# Patient Record
Sex: FEMALE | Race: BLACK OR AFRICAN AMERICAN | NOT HISPANIC OR LATINO | Employment: UNEMPLOYED | URBAN - METROPOLITAN AREA
[De-identification: names, ages, dates, MRNs, and addresses within clinical notes are randomized per-mention and may not be internally consistent; named-entity substitution may affect disease eponyms.]

---

## 2017-11-15 ENCOUNTER — OFFICE VISIT (OUTPATIENT)
Dept: OBSTETRICS AND GYNECOLOGY | Facility: CLINIC | Age: 32
End: 2017-11-15
Attending: OBSTETRICS & GYNECOLOGY
Payer: MEDICARE

## 2017-11-15 VITALS — BODY MASS INDEX: 26.25 KG/M2 | HEIGHT: 63 IN | WEIGHT: 148.13 LBS

## 2017-11-15 DIAGNOSIS — Z11.51 SCREENING FOR HUMAN PAPILLOMAVIRUS: ICD-10-CM

## 2017-11-15 DIAGNOSIS — Z01.419 ENCOUNTER FOR GYNECOLOGICAL EXAMINATION (GENERAL) (ROUTINE) WITHOUT ABNORMAL FINDINGS: ICD-10-CM

## 2017-11-15 DIAGNOSIS — Z12.4 PAP SMEAR FOR CERVICAL CANCER SCREENING: ICD-10-CM

## 2017-11-15 DIAGNOSIS — N92.0 MENORRHAGIA WITH REGULAR CYCLE: Primary | ICD-10-CM

## 2017-11-15 DIAGNOSIS — N92.2 EXCESSIVE MENSTRUATION AT PUBERTY: ICD-10-CM

## 2017-11-15 PROCEDURE — 87624 HPV HI-RISK TYP POOLED RSLT: CPT

## 2017-11-15 PROCEDURE — 99213 OFFICE O/P EST LOW 20 MIN: CPT | Mod: PBBFAC | Performed by: OBSTETRICS & GYNECOLOGY

## 2017-11-15 PROCEDURE — 88141 CYTOPATH C/V INTERPRET: CPT | Mod: ,,, | Performed by: PATHOLOGY

## 2017-11-15 PROCEDURE — G0101 CA SCREEN;PELVIC/BREAST EXAM: HCPCS | Mod: S$PBB,,, | Performed by: OBSTETRICS & GYNECOLOGY

## 2017-11-15 PROCEDURE — 99999 PR PBB SHADOW E&M-EST. PATIENT-LVL III: CPT | Mod: PBBFAC,,, | Performed by: OBSTETRICS & GYNECOLOGY

## 2017-11-15 PROCEDURE — 88175 CYTOPATH C/V AUTO FLUID REDO: CPT | Performed by: PATHOLOGY

## 2017-11-15 RX ORDER — TRAMADOL HYDROCHLORIDE 50 MG/1
TABLET ORAL
Refills: 3 | COMMUNITY
Start: 2017-10-22

## 2017-11-15 NOTE — PROGRESS NOTES
Subjective:       Patient ID: Rhina Hdez is a 32 y.o. female.    Chief Complaint:  Annual Exam (new patient - found Harper on google - last pap unknown - long time possibly over 3 years)      Patient Active Problem List   Diagnosis    Iron deficiency anemia, unspecified    Luteal cystic ovary disease    Spongiotic psoriasiform dermatitis    Severe protein-calorie malnutrition    SLE (systemic lupus erythematosus)    Hypokalemia    Chronic use of steroids    Cutaneous lupus erythematosus    Blepharitis of both eyes    Erythroderma    Impetigo    Cellulitis and abscess of other specified site    Dermatitis    Lupus    Proteinuria       History of Present Illness  32 y.o. yo  here for annual exam. New patient. Wants to get pregnant and has not been able to. On Medicare for Lupus. Had 2 spontaneous pregnancies in  and  with the same partner. Had IUD removed in  and has been trying since then. Periods are heavy and regular.       Past Medical History:   Diagnosis Date    Anemia     Asthma     Bacteremia due to methicillin resistant Staphylococcus aureus 2013    Cutaneous lupus erythematosus     Lupus 2007    Psoriasis-like skin disease 2013    SLE (systemic lupus erythematosus)     [facility]     Spongiotic psoriasiform dermatitis 2012    Punch biopsy of L leg       Past Surgical History:   Procedure Laterality Date     SECTION, CLASSIC         OB History    Para Term  AB Living   2 2 2     2   SAB TAB Ectopic Multiple Live Births           2      # Outcome Date GA Lbr Nigel/2nd Weight Sex Delivery Anes PTL Lv   2 Term 09 40w0d  3.629 kg (8 lb) F CS-LTranv   GISELLA   1 Term 08 40w0d  3.402 kg (7 lb 8 oz) F CS-LTranv   GISELLA      Obstetric Comments   Menarche at 13       Patient's last menstrual period was 10/19/2017.   Date of Last Pap: 11/15/2017    Review of Systems  Review of Systems   Constitutional: Negative  for fatigue and unexpected weight change.   Respiratory: Negative for shortness of breath.    Cardiovascular: Negative for chest pain.   Gastrointestinal: Negative for abdominal pain, constipation, diarrhea, nausea and vomiting.   Genitourinary: Negative for dysuria.   Musculoskeletal: Negative for back pain.   Skin: Negative for rash.   Neurological: Negative for headaches.   Hematological: Does not bruise/bleed easily.   Psychiatric/Behavioral: Negative for behavioral problems.        Objective:   Physical Exam:   Constitutional: She is oriented to person, place, and time. Vital signs are normal. She appears well-developed and well-nourished. No distress.        Pulmonary/Chest: She exhibits no mass. Right breast exhibits no mass, no nipple discharge, no skin change, no tenderness, no bleeding and no swelling. Left breast exhibits no mass, no nipple discharge, no skin change, no tenderness, no bleeding and no swelling. Breasts are symmetrical.        Abdominal: Soft. Normal appearance and bowel sounds are normal. She exhibits no distension and no mass. There is no tenderness. There is no rebound.     Genitourinary: Vagina normal. There is no rash, tenderness, lesion or injury on the right labia. There is no rash, tenderness, lesion or injury on the left labia. Uterus is enlarged (possibly enlarged, difficult exam). Uterus is not deviated, not fixed, not tender, not hosting fibroids and not experiencing uterine prolapse. Cervix is normal. Right adnexum displays no mass, no tenderness and no fullness. Left adnexum displays no mass, no tenderness and no fullness. No erythema (some cracks in skin possible c/w yeast. pt says it is from Lupus and that it is always like that and she does not want treatment for it), tenderness, rectocele, cystocele or unspecified prolapse of vaginal walls in the vagina. No vaginal discharge found. Cervix exhibits no motion tenderness, no discharge and no friability.        Uterus Size: 10  cm   Musculoskeletal: Normal range of motion and moves all extremeties.      Lymphadenopathy:     She has no axillary adenopathy.        Right: No supraclavicular adenopathy present.        Left: No supraclavicular adenopathy present.    Neurological: She is alert and oriented to person, place, and time.    Skin: Skin is warm and dry.    Psychiatric: She has a normal mood and affect. Her behavior is normal. Judgment normal.        Assessment/ Plan:     1. Menorrhagia with regular cycle  US Pelvis Comp with Transvag NON-OB (xpd   2. Excessive menstruation at puberty     3. Screening for human papillomavirus  HPV High Risk Genotypes, PCR   4. Pap smear for cervical cancer screening  Liquid-based pap smear, screening   5. Encounter for gynecological examination (general) (routine) without abnormal findings       rec RTC for GYN u/s for menorrhagia.   Also trying to get pregnant. I suggested SA and pt is worried about cost.     Follow-up with me in 1 year

## 2017-11-20 LAB
HPV16 AG SPEC QL: NEGATIVE
HPV16+18+H RISK 12 DNA CVX-IMP: NEGATIVE
HPV18 DNA SPEC QL NAA+PROBE: NEGATIVE

## 2017-11-27 ENCOUNTER — TELEPHONE (OUTPATIENT)
Dept: OBSTETRICS AND GYNECOLOGY | Facility: CLINIC | Age: 32
End: 2017-11-27

## 2017-11-27 NOTE — TELEPHONE ENCOUNTER
----- Message from Geetha Saleem MD sent at 11/27/2017 12:50 PM CST -----  Call pt or send letter and tell them.....    Good News!! Everything came back fine with your pap smear. The purpose of a pap smear is to detect abnormal cells on the cervix. The initial reading on your pap smear showed a few mildly abnormal cells. For this reason, I had the lab run a HPV test on your pap smear. HPV is Human Papilloma Virus- which is a sexually transmitted virus that can give your abnormal cells on your pap smear. And your pap was NEGATIVE for HPV! Having a negative HPV is the best confirmation that you do not have cervical cancer and the chances of you getting cervical cancer in the next 3-5 years is extremely small. So everything looks good!! I recommend you repeat your pap smear in 1 year. Please call or message if you have any other questions.  Sincerely,  Dr. Saleem

## 2017-11-27 NOTE — TELEPHONE ENCOUNTER
Called pt with normal pap/hpv results and pt requested to change her upcoming appts. Changed in EPIC.

## 2020-10-20 ENCOUNTER — HOSPITAL ENCOUNTER (OUTPATIENT)
Dept: RADIOLOGY | Facility: OTHER | Age: 35
Discharge: HOME | End: 2020-10-20
Attending: OTOLARYNGOLOGY
Payer: MEDICARE

## 2020-10-20 DIAGNOSIS — H90.0 CONDUCTIVE HEARING LOSS OF BOTH EARS: ICD-10-CM

## 2020-10-20 PROCEDURE — 70480 CT ORBIT/EAR/FOSSA W/O DYE: CPT | Mod: 26,,, | Performed by: RADIOLOGY

## 2020-10-20 PROCEDURE — 70480 CT TEMPORAL BONE WITHOUT CONTRAST: ICD-10-PCS | Mod: 26,,, | Performed by: RADIOLOGY

## 2020-10-20 PROCEDURE — 70480 CT ORBIT/EAR/FOSSA W/O DYE: CPT | Mod: TC

## 2024-10-14 ENCOUNTER — PATIENT MESSAGE (OUTPATIENT)
Dept: GASTROENTEROLOGY | Facility: CLINIC | Age: 39
End: 2024-10-14
Payer: MEDICARE

## 2025-05-14 ENCOUNTER — HOSPITAL ENCOUNTER (INPATIENT)
Facility: HOSPITAL | Age: 40
LOS: 3 days | Discharge: HOME | DRG: 153 | End: 2025-05-17
Attending: STUDENT IN AN ORGANIZED HEALTH CARE EDUCATION/TRAINING PROGRAM | Admitting: STUDENT IN AN ORGANIZED HEALTH CARE EDUCATION/TRAINING PROGRAM
Payer: MEDICARE

## 2025-05-14 DIAGNOSIS — R07.9 CHEST PAIN: ICD-10-CM

## 2025-05-14 DIAGNOSIS — H66.90 RECURRENT AOM (ACUTE OTITIS MEDIA): Primary | ICD-10-CM

## 2025-05-14 DIAGNOSIS — H70.90 MASTOIDITIS: ICD-10-CM

## 2025-05-14 DIAGNOSIS — L30.8 SPONGIOTIC PSORIASIFORM DERMATITIS: ICD-10-CM

## 2025-05-14 DIAGNOSIS — R29.818 ACUTE FOCAL NEUROLOGICAL DEFICIT: ICD-10-CM

## 2025-05-14 DIAGNOSIS — H70.91 MASTOIDITIS OF RIGHT SIDE: ICD-10-CM

## 2025-05-14 LAB
ABSOLUTE EOSINOPHIL (OHS): 0.23 K/UL
ABSOLUTE MONOCYTE (OHS): 0.68 K/UL (ref 0.3–1)
ABSOLUTE NEUTROPHIL COUNT (OHS): 4.77 K/UL (ref 1.8–7.7)
ALBUMIN SERPL BCP-MCNC: 3.3 G/DL (ref 3.5–5.2)
ALP SERPL-CCNC: 85 UNIT/L (ref 40–150)
ALT SERPL W/O P-5'-P-CCNC: 9 UNIT/L (ref 10–44)
ANION GAP (OHS): 14 MMOL/L (ref 8–16)
AST SERPL-CCNC: 17 UNIT/L (ref 11–45)
B-HCG UR QL: NEGATIVE
BASOPHILS # BLD AUTO: 0.06 K/UL
BASOPHILS NFR BLD AUTO: 0.6 %
BILIRUB SERPL-MCNC: 0.4 MG/DL (ref 0.1–1)
BUN SERPL-MCNC: 14 MG/DL (ref 6–20)
CALCIUM SERPL-MCNC: 9.6 MG/DL (ref 8.7–10.5)
CHLORIDE SERPL-SCNC: 102 MMOL/L (ref 95–110)
CO2 SERPL-SCNC: 19 MMOL/L (ref 23–29)
CREAT SERPL-MCNC: 0.8 MG/DL (ref 0.5–1.4)
CRP SERPL-MCNC: 32.7 MG/L
CTP QC/QA: YES
ERYTHROCYTE [DISTWIDTH] IN BLOOD BY AUTOMATED COUNT: 15.3 % (ref 11.5–14.5)
GFR SERPLBLD CREATININE-BSD FMLA CKD-EPI: >60 ML/MIN/1.73/M2
GLUCOSE SERPL-MCNC: 90 MG/DL (ref 70–110)
HCT VFR BLD AUTO: 33 % (ref 37–48.5)
HGB BLD-MCNC: 11.2 GM/DL (ref 12–16)
IMM GRANULOCYTES # BLD AUTO: 0.04 K/UL (ref 0–0.04)
IMM GRANULOCYTES NFR BLD AUTO: 0.4 % (ref 0–0.5)
LYMPHOCYTES # BLD AUTO: 3.62 K/UL (ref 1–4.8)
MCH RBC QN AUTO: 26.7 PG (ref 27–31)
MCHC RBC AUTO-ENTMCNC: 33.9 G/DL (ref 32–36)
MCV RBC AUTO: 79 FL (ref 82–98)
NUCLEATED RBC (/100WBC) (OHS): 0 /100 WBC
PLATELET # BLD AUTO: 437 K/UL (ref 150–450)
PMV BLD AUTO: 9.3 FL (ref 9.2–12.9)
POCT GLUCOSE: 104 MG/DL (ref 70–110)
POTASSIUM SERPL-SCNC: 3.9 MMOL/L (ref 3.5–5.1)
PROCALCITONIN SERPL-MCNC: 0.03 NG/ML
PROT SERPL-MCNC: 9.6 GM/DL (ref 6–8.4)
RBC # BLD AUTO: 4.19 M/UL (ref 4–5.4)
RELATIVE EOSINOPHIL (OHS): 2.4 %
RELATIVE LYMPHOCYTE (OHS): 38.5 % (ref 18–48)
RELATIVE MONOCYTE (OHS): 7.2 % (ref 4–15)
RELATIVE NEUTROPHIL (OHS): 50.9 % (ref 38–73)
SODIUM SERPL-SCNC: 135 MMOL/L (ref 136–145)
TSH SERPL-ACNC: 0.66 UIU/ML (ref 0.4–4)
WBC # BLD AUTO: 9.4 K/UL (ref 3.9–12.7)

## 2025-05-14 PROCEDURE — 80053 COMPREHEN METABOLIC PANEL: CPT

## 2025-05-14 PROCEDURE — 96375 TX/PRO/DX INJ NEW DRUG ADDON: CPT

## 2025-05-14 PROCEDURE — 96361 HYDRATE IV INFUSION ADD-ON: CPT

## 2025-05-14 PROCEDURE — 85025 COMPLETE CBC W/AUTO DIFF WBC: CPT

## 2025-05-14 PROCEDURE — 25500020 PHARM REV CODE 255: Performed by: STUDENT IN AN ORGANIZED HEALTH CARE EDUCATION/TRAINING PROGRAM

## 2025-05-14 PROCEDURE — 63600175 PHARM REV CODE 636 W HCPCS: Performed by: STUDENT IN AN ORGANIZED HEALTH CARE EDUCATION/TRAINING PROGRAM

## 2025-05-14 PROCEDURE — 99285 EMERGENCY DEPT VISIT HI MDM: CPT | Mod: 25

## 2025-05-14 PROCEDURE — 25000003 PHARM REV CODE 250: Performed by: STUDENT IN AN ORGANIZED HEALTH CARE EDUCATION/TRAINING PROGRAM

## 2025-05-14 PROCEDURE — 84443 ASSAY THYROID STIM HORMONE: CPT

## 2025-05-14 PROCEDURE — 81025 URINE PREGNANCY TEST: CPT

## 2025-05-14 PROCEDURE — 84145 PROCALCITONIN (PCT): CPT | Performed by: STUDENT IN AN ORGANIZED HEALTH CARE EDUCATION/TRAINING PROGRAM

## 2025-05-14 PROCEDURE — 82962 GLUCOSE BLOOD TEST: CPT

## 2025-05-14 PROCEDURE — 87040 BLOOD CULTURE FOR BACTERIA: CPT | Performed by: STUDENT IN AN ORGANIZED HEALTH CARE EDUCATION/TRAINING PROGRAM

## 2025-05-14 PROCEDURE — 86140 C-REACTIVE PROTEIN: CPT | Performed by: STUDENT IN AN ORGANIZED HEALTH CARE EDUCATION/TRAINING PROGRAM

## 2025-05-14 PROCEDURE — 96365 THER/PROPH/DIAG IV INF INIT: CPT

## 2025-05-14 PROCEDURE — 12000002 HC ACUTE/MED SURGE SEMI-PRIVATE ROOM

## 2025-05-14 RX ORDER — SERTRALINE HYDROCHLORIDE 100 MG/1
100 TABLET, FILM COATED ORAL DAILY
COMMUNITY
Start: 2024-11-21

## 2025-05-14 RX ORDER — KETOROLAC TROMETHAMINE 30 MG/ML
15 INJECTION, SOLUTION INTRAMUSCULAR; INTRAVENOUS
Status: COMPLETED | OUTPATIENT
Start: 2025-05-14 | End: 2025-05-14

## 2025-05-14 RX ORDER — VALACYCLOVIR HYDROCHLORIDE 500 MG/1
1000 TABLET, FILM COATED ORAL ONCE
Status: COMPLETED | OUTPATIENT
Start: 2025-05-14 | End: 2025-05-14

## 2025-05-14 RX ORDER — AMOXICILLIN 500 MG/1
500 TABLET, FILM COATED ORAL 2 TIMES DAILY
Status: ON HOLD | COMMUNITY
Start: 2025-05-07 | End: 2025-05-17 | Stop reason: HOSPADM

## 2025-05-14 RX ORDER — AMOXICILLIN AND CLAVULANATE POTASSIUM 875; 125 MG/1; MG/1
1 TABLET, FILM COATED ORAL
Status: DISCONTINUED | OUTPATIENT
Start: 2025-05-14 | End: 2025-05-14

## 2025-05-14 RX ADMIN — PIPERACILLIN AND TAZOBACTAM 4.5 G: 4; .5 INJECTION, POWDER, LYOPHILIZED, FOR SOLUTION INTRAVENOUS; PARENTERAL at 09:05

## 2025-05-14 RX ADMIN — KETOROLAC TROMETHAMINE 15 MG: 30 INJECTION, SOLUTION INTRAMUSCULAR; INTRAVENOUS at 06:05

## 2025-05-14 RX ADMIN — IOHEXOL 75 ML: 350 INJECTION, SOLUTION INTRAVENOUS at 06:05

## 2025-05-14 RX ADMIN — SODIUM CHLORIDE, POTASSIUM CHLORIDE, SODIUM LACTATE AND CALCIUM CHLORIDE 1000 ML: 600; 310; 30; 20 INJECTION, SOLUTION INTRAVENOUS at 06:05

## 2025-05-14 RX ADMIN — VALACYCLOVIR HYDROCHLORIDE 1000 MG: 500 TABLET, FILM COATED ORAL at 09:05

## 2025-05-14 NOTE — ED TRIAGE NOTES
Pt c/o right sided ear pain, headache, and right sided facial droop to her face since x1 week. Pt was seen and tx at  last wednesday for earache and prescribed amoxicillin which pt reports completing. Pt also states that she had a few febrile episodes but was able to manage at home. Pt denies any CP, numbness/tingling, weakness, SOB, n/v/d, or urinary symptoms. Pmhx of bacteremia and lupus. Pt is [place] in right ear, use of hearing device.

## 2025-05-14 NOTE — ED PROVIDER NOTES
"Encounter Date: 5/14/2025    SCRIBE #1 NOTE: I, Alexa Luna, am scribing for, and in the presence of,  Adrienne Farr MD. I have scribed the following portions of the note - Other sections scribed: HPI, ROS, PE.       History     Chief Complaint   Patient presents with    Facial Droop     Pt reports having right ear pain on Wednesday, went to  and had her ears "pumped out". Pt reports Wednesday night she began having generalized headaches and drooping/paralysis/pain to right side of her face. Pt denies change in vision.      39-year-old female, with a PMHx of SLE, hearing loss 2/2 SLE, DEIDRE, and asthma, presents to the ED for evaluation of R ear pain that began 1 week ago. Patient reports associated generalized headache, R hearing loss, R ear drainage, R sided headache, blurry vision, and night sweats. She does note that she has had narrowing R ear canal and R hearing loss 2/2 lupus since 2011. She reports she was seen at  7 days ago for the same complaints, her ears were both flushed out, and she was prescribed a 7-day-course of Amoxil 500 mg for her R ear infection and dental infection. She endorses she began taking her Amoxil 6 days ago, took them as prescribed, and has completed them. She reports she began having L hearing loss after her ears were flushed out. She states she has not seen ENT this year and missed an ENT appointment on 5/6/2025.     Patient reports she woke up 6 days ago with R facial droop, no history of Bell's palsy.    She reports she has been having fevers, her T-max 2 days ago was 102 F, and it decreased to 101 F yesterday. She reports attempting treatment for symptoms with Tylenol and ice packs.     She reports dental pain.     Denies history of DM. Denies chest pain, SOB, sore throat, cough, urinary complaints, change in bowel movements, difficulty having bowel movements, sublingual pain, oral pain, or other associated symptoms.     The history is provided by the patient. No language "  was used.     Review of patient's allergies indicates:   Allergen Reactions    Sulfa (sulfonamide antibiotics) Rash     Past Medical History:   Diagnosis Date    Anemia     Asthma     Bacteremia due to methicillin resistant Staphylococcus aureus 2013    Cutaneous lupus erythematosus     Lupus 2007    Psoriasis-like skin disease 2013    SLE (systemic lupus erythematosus)     [facility]     Spongiotic psoriasiform dermatitis     Punch biopsy of L leg     Past Surgical History:   Procedure Laterality Date     SECTION, CLASSIC       Family History   Problem Relation Name Age of Onset    Breast cancer Mother  52        passed at age 52     Social History[1]  Review of Systems   Constitutional:  Positive for diaphoresis and fever.   HENT:  Positive for dental problem, ear discharge (R) and ear pain (R). Negative for sore throat.    Eyes:  Positive for visual disturbance (blurry vision).   Respiratory:  Negative for cough and shortness of breath.    Cardiovascular:  Negative for chest pain and leg swelling.   Gastrointestinal:  Negative for abdominal pain, diarrhea, nausea and vomiting.   Genitourinary:  Negative for dysuria and hematuria.   Musculoskeletal:  Negative for back pain and neck pain.   Skin:  Negative for rash.   Neurological:  Positive for headaches. Negative for syncope.       Physical Exam     Initial Vitals [25 1559]   BP Pulse Resp Temp SpO2   (!) 192/115 (!) 111 18 97.4 °F (36.3 °C) 100 %      MAP       --         Physical Exam    Nursing note and vitals reviewed.  Constitutional:  Non-toxic appearance. No distress.   HENT:   Head: Atraumatic.   Right Ear: There is mastoid tenderness.   Left Ear: No tenderness. No mastoid tenderness. Mouth/Throat: Mucous membranes are normal. No posterior oropharyngeal edema or posterior oropharyngeal erythema.   R ear canal is significantly narrowed, with significant purulent fluid draining, No tenderness  to external R ear. L ear canal is narrowed, with clear fluid. No sublingual tenderness.   Eyes: Conjunctivae and EOM are normal.   Neck:   No meningismus.   Cardiovascular:  Normal rate, regular rhythm, normal heart sounds and intact distal pulses.           No murmur heard.  Pulmonary/Chest: Breath sounds normal. No respiratory distress. She has no wheezes. She has no rhonchi.   Abdominal: Abdomen is soft. She exhibits no distension. There is no abdominal tenderness.   No right CVA tenderness.  No left CVA tenderness. There is no guarding.     Neurological: She is alert and oriented to person, place, and time. A cranial nerve deficit is present. No sensory deficit.   Right facial droop, unable to lift right forehead, unable to close right eyelid         ED Course   Procedures  Labs Reviewed   COMPREHENSIVE METABOLIC PANEL - Abnormal       Result Value    Sodium 135 (*)     Potassium 3.9      Chloride 102      CO2 19 (*)     Glucose 90      BUN 14      Creatinine 0.8      Calcium 9.6      Protein Total 9.6 (*)     Albumin 3.3 (*)     Bilirubin Total 0.4      ALP 85      AST 17      ALT 9 (*)     Anion Gap 14      eGFR >60      Narrative:     Specimen slightly hemolyzed.    CBC WITH DIFFERENTIAL - Abnormal    WBC 9.40      RBC 4.19      HGB 11.2 (*)     HCT 33.0 (*)     MCV 79 (*)     MCH 26.7 (*)     MCHC 33.9      RDW 15.3 (*)     Platelet Count 437      MPV 9.3      Nucleated RBC 0      Neut % 50.9      Lymph % 38.5      Mono % 7.2      Eos % 2.4      Basophil % 0.6      Imm Grans % 0.4      Neut # 4.77      Lymph # 3.62      Mono # 0.68      Eos # 0.23      Baso # 0.06      Imm Grans # 0.04     C-REACTIVE PROTEIN - Abnormal    CRP 32.7 (*)    CULTURE, BLOOD - Normal    Blood Culture No Growth After 6 Hours     TSH - Normal    TSH 0.664     PROCALCITONIN - Normal    Procalcitonin 0.03     CULTURE, BLOOD   CBC W/ AUTO DIFFERENTIAL    Narrative:     The following orders were created for panel order CBC W/ AUTO  DIFFERENTIAL.  Procedure                               Abnormality         Status                     ---------                               -----------         ------                     CBC with Differential[[id number]]       Abnormal            Final result                 Please view results for these tests on the individual orders.   BASIC METABOLIC PANEL   MAGNESIUM   PHOSPHORUS   CBC W/ AUTO DIFFERENTIAL    Narrative:     The following orders were created for panel order CBC with Automated Differential.  Procedure                               Abnormality         Status                     ---------                               -----------         ------                     CBC with Differential[[id number]]                                                        Please view results for these tests on the individual orders.   HEMOGLOBIN A1C   CBC WITH DIFFERENTIAL   POCT URINE PREGNANCY    POC Preg Test, Ur Negative       Acceptable Yes     POCT GLUCOSE, HAND-HELD DEVICE   POCT GLUCOSE    POCT Glucose 104            Imaging Results              CT Temporal Bone with Contrast (Final result)  Result time 05/14/25 19:41:47      Final result by Ronen Feliciano MD (05/14/25 19:41:47)                   Impression:      1. No acute intracranial abnormalities identified.  2. Complete opacification of the bilateral mastoid air cells and middle ear cavities.  Such findings can be seen with potential otitis media and mastoiditis in the right clinical setting.  3. Complete occlusion of the right and partial incomplete occlusion of the left external auditory canals.  Such findings can be seen with potential otitis externa.  Clinical correlation is obviously essential.      Electronically signed by: Ronen Feliciano MD  Date:    05/14/2025  Time:    19:41               Narrative:    EXAMINATION:  CT TEMPORAL BONE WITH CONTRAST; CT HEAD WITHOUT CONTRAST    CLINICAL HISTORY:  Mastoiditis;; Headache, new or  worsening, neuro deficit (Age 19-49y);    TECHNIQUE:  CT head with sagittal and coronal reformats were obtained without the use of IV contrast.  CT temporal bone was also obtained by separate acquisition following administration of 75 cc Omnipaque 350 IV contrast (sagittal and coronal reformats were not provided).    COMPARISON:  None    FINDINGS:  Complete fluid opacification is seen of the bilateral mastoid air cells and middle ear cavities.  There is complete occlusion of the right external auditory canal and partial incomplete occlusion of the left external auditory canal.    No evidence of acute/recent major vascular distribution cerebral infarction, intraparenchymal hemorrhage, or intra-axial space occupying lesion. The ventricular system is normal in size and configuration with no evidence of hydrocephalus. No effacement of the skull-base cisterns. No abnormal extra-axial fluid collections or blood products.  Left maxillary mucous retention cyst or polyp is noted.  Remaining visualized paranasal sinuses are essentially clear.  The calvarium shows no significant abnormality.                                       CT Head Without Contrast (Final result)  Result time 05/14/25 19:41:47      Final result by Ronen Feliciano MD (05/14/25 19:41:47)                   Impression:      1. No acute intracranial abnormalities identified.  2. Complete opacification of the bilateral mastoid air cells and middle ear cavities.  Such findings can be seen with potential otitis media and mastoiditis in the right clinical setting.  3. Complete occlusion of the right and partial incomplete occlusion of the left external auditory canals.  Such findings can be seen with potential otitis externa.  Clinical correlation is obviously essential.      Electronically signed by: Ronen Feliciano MD  Date:    05/14/2025  Time:    19:41               Narrative:    EXAMINATION:  CT TEMPORAL BONE WITH CONTRAST; CT HEAD WITHOUT  CONTRAST    CLINICAL HISTORY:  Mastoiditis;; Headache, new or worsening, neuro deficit (Age 19-49y);    TECHNIQUE:  CT head with sagittal and coronal reformats were obtained without the use of IV contrast.  CT temporal bone was also obtained by separate acquisition following administration of 75 cc Omnipaque 350 IV contrast (sagittal and coronal reformats were not provided).    COMPARISON:  None    FINDINGS:  Complete fluid opacification is seen of the bilateral mastoid air cells and middle ear cavities.  There is complete occlusion of the right external auditory canal and partial incomplete occlusion of the left external auditory canal.    No evidence of acute/recent major vascular distribution cerebral infarction, intraparenchymal hemorrhage, or intra-axial space occupying lesion. The ventricular system is normal in size and configuration with no evidence of hydrocephalus. No effacement of the skull-base cisterns. No abnormal extra-axial fluid collections or blood products.  Left maxillary mucous retention cyst or polyp is noted.  Remaining visualized paranasal sinuses are essentially clear.  The calvarium shows no significant abnormality.                                       Medications   artificial tears 0.5 % ophthalmic solution 1 drop (has no administration in time range)   hydroxychloroquine tablet 200 mg (has no administration in time range)   sertraline tablet 100 mg (has no administration in time range)   sodium chloride 0.9% flush 3 mL (has no administration in time range)   naloxone 0.4 mg/mL injection 0.02 mg (has no administration in time range)   oxyCODONE immediate release tablet 5 mg (has no administration in time range)   morphine injection 4 mg (4 mg Intravenous Given 5/15/25 0140)   acetaminophen tablet 650 mg (has no administration in time range)   polyethylene glycol packet 17 g (has no administration in time range)   ondansetron injection 4 mg (has no administration in time range)    piperacillin-tazobactam (ZOSYN) 4.5 g in D5W 100 mL IVPB (MB+) (has no administration in time range)   heparin (porcine) injection 5,000 Units (has no administration in time range)   lactated ringers bolus 1,000 mL (0 mLs Intravenous Stopped 5/14/25 2020)   ketorolac injection 15 mg (15 mg Intravenous Given 5/14/25 1827)   iohexoL (OMNIPAQUE 350) injection 75 mL (75 mLs Intravenous Given 5/14/25 1840)   piperacillin-tazobactam (ZOSYN) 4.5 g in D5W 100 mL IVPB (MB+) (0 g Intravenous Stopped 5/14/25 2136)   valACYclovir tablet 1,000 mg (1,000 mg Oral Given 5/14/25 2120)     Medical Decision Making  39-year-old female, with a PMHx of SLE, hearing loss 2/2 SLE, DEIDRE, and asthma, presents to the ED for evaluation of R ear pain that began 1 week ago. Patient reports associated generalized headache, R hearing loss, R ear drainage, R sided headache, blurry vision, and night sweats.     Differential diagnosis includes but is not limited to: AOM, mastoiditis, external otitis, Bell's Palsy    Patient with patrizia purulent drainage from right ear, irrigated, with significant narrowing of bilateral ear canals (2/2 cutaneous lupus?), unable to visualize tympanic membranes, clear fluid draining from left ear.  Does have mastoid tenderness on the right, with inconclusive CT imaging, has significant ENT history from 2020 chart review with mastoiditis findings on CT (also purportedly with Bell's palsy at that time) but had not followed with ENT.  Given mastoid tenderness on the right, with patient reporting fevers this past week, after amoxicillin, discussed admission with IV antibiotics or mastoiditis with the ENT evaluation tomorrow, versus trial of outpatient Augmentin for broader coverage.  Patient reports that she has been attempting to get to her ENT Clinic, but has been unable to schedule an appointment until mid June.  We will start antibiotics, admit patient, for ENT evaluation tomorrow for possible mastoiditis.  Discussed  with radiology regarding obtaining MRI for acute versus chronic mastoiditis, per Radiology MRI imaging would not increase sensitivity for acute versus chronic mastoiditis.    Patient is out of the window for best valacyclovir administration for Bell's palsy however will start valacyclovir here, though given acute infection facial palsy may be due to her otomastoiditis, no evidence of vesicular lesions on exam.    Patient with no evidence of sepsis, no tachycardia, no leukocytosis, procalcitonin is normal.  CRP is elevated at 32.    After review of the patient's physical exam, ED testing, and history/symptoms, the patient requires additional care in the hospital overnight. The hospital medicine service will accept the patient and relevant labs, imaging, or procedures were discussed and they were made aware of any pending labs/imaging/interventions. The diagnosis, treatment and plan were discussed with the patient. All questions and/or concerns have been addressed to the best of my ability.      Please put in 30 minutes of critical care due to patient having severe infection requiring IV antibiotics.  Separate from teaching and exclusive of procedure and ekg time  Includes:  Time at bedside  Time reviewing test results  Time discussing case with staff  Time documenting the medical record  Time spent with family members  Time spent with consults  Management          Amount and/or Complexity of Data Reviewed  Labs: ordered.  Radiology: ordered.    Risk  Prescription drug management.  Decision regarding hospitalization.            Scribe Attestation:   Scribe #1: I performed the above scribed service and the documentation accurately describes the services I performed. I attest to the accuracy of the note.                           I, Adrienne Farr, personally performed the services described in this documentation. All medical record entries made by the scribe were at my direction and in my presence. I have reviewed the  chart and agree that the record reflects my personal performance and is accurate and complete.      Clinical Impression:  Final diagnoses:  [R29.818] Acute focal neurological deficit  [H66.90] Recurrent AOM (acute otitis media) (Primary)  [H70.90] Mastoiditis          ED Disposition Condition    Admit                       [1]   Social History  Tobacco Use    Smoking status: Every Day     Current packs/day: 0.50     Average packs/day: 0.5 packs/day for 2.0 years (1.0 ttl pk-yrs)     Types: Cigarettes    Smokeless tobacco: Never   Substance Use Topics    Alcohol use: Yes     Comment: on occassion    Drug use: No        Adrienne Farr MD  05/15/25 0449

## 2025-05-15 PROBLEM — D64.9 ANEMIA: Status: RESOLVED | Noted: 2025-05-15 | Resolved: 2025-05-15

## 2025-05-15 PROBLEM — D64.9 ANEMIA: Status: ACTIVE | Noted: 2025-05-15

## 2025-05-15 PROBLEM — H70.90 MASTOIDITIS: Status: ACTIVE | Noted: 2025-05-15

## 2025-05-15 LAB
ABSOLUTE EOSINOPHIL (OHS): 0.24 K/UL
ABSOLUTE MONOCYTE (OHS): 0.53 K/UL (ref 0.3–1)
ABSOLUTE NEUTROPHIL COUNT (OHS): 2.44 K/UL (ref 1.8–7.7)
ANION GAP (OHS): 11 MMOL/L (ref 8–16)
BASOPHILS # BLD AUTO: 0.05 K/UL
BASOPHILS NFR BLD AUTO: 0.8 %
BUN SERPL-MCNC: 12 MG/DL (ref 6–20)
CALCIUM SERPL-MCNC: 8.7 MG/DL (ref 8.7–10.5)
CHLORIDE SERPL-SCNC: 105 MMOL/L (ref 95–110)
CO2 SERPL-SCNC: 24 MMOL/L (ref 23–29)
CREAT SERPL-MCNC: 0.9 MG/DL (ref 0.5–1.4)
EAG (OHS): 82 MG/DL (ref 68–131)
ERYTHROCYTE [DISTWIDTH] IN BLOOD BY AUTOMATED COUNT: 15.2 % (ref 11.5–14.5)
GFR SERPLBLD CREATININE-BSD FMLA CKD-EPI: >60 ML/MIN/1.73/M2
GLUCOSE SERPL-MCNC: 86 MG/DL (ref 70–110)
HBA1C MFR BLD: 4.5 % (ref 4–5.6)
HCT VFR BLD AUTO: 29.5 % (ref 37–48.5)
HGB BLD-MCNC: 9.8 GM/DL (ref 12–16)
IMM GRANULOCYTES # BLD AUTO: 0.01 K/UL (ref 0–0.04)
IMM GRANULOCYTES NFR BLD AUTO: 0.2 % (ref 0–0.5)
LYMPHOCYTES # BLD AUTO: 2.95 K/UL (ref 1–4.8)
MAGNESIUM SERPL-MCNC: 1.5 MG/DL (ref 1.6–2.6)
MCH RBC QN AUTO: 26.5 PG (ref 27–31)
MCHC RBC AUTO-ENTMCNC: 33.2 G/DL (ref 32–36)
MCV RBC AUTO: 80 FL (ref 82–98)
NUCLEATED RBC (/100WBC) (OHS): 0 /100 WBC
PHOSPHATE SERPL-MCNC: 4.1 MG/DL (ref 2.7–4.5)
PLATELET # BLD AUTO: 392 K/UL (ref 150–450)
PMV BLD AUTO: 9.5 FL (ref 9.2–12.9)
POTASSIUM SERPL-SCNC: 3.1 MMOL/L (ref 3.5–5.1)
RBC # BLD AUTO: 3.7 M/UL (ref 4–5.4)
RELATIVE EOSINOPHIL (OHS): 3.9 %
RELATIVE LYMPHOCYTE (OHS): 47.4 % (ref 18–48)
RELATIVE MONOCYTE (OHS): 8.5 % (ref 4–15)
RELATIVE NEUTROPHIL (OHS): 39.2 % (ref 38–73)
SODIUM SERPL-SCNC: 140 MMOL/L (ref 136–145)
WBC # BLD AUTO: 6.22 K/UL (ref 3.9–12.7)

## 2025-05-15 PROCEDURE — 63600175 PHARM REV CODE 636 W HCPCS

## 2025-05-15 PROCEDURE — 25000003 PHARM REV CODE 250

## 2025-05-15 PROCEDURE — 11000001 HC ACUTE MED/SURG PRIVATE ROOM

## 2025-05-15 PROCEDURE — 80048 BASIC METABOLIC PNL TOTAL CA: CPT

## 2025-05-15 PROCEDURE — 63600175 PHARM REV CODE 636 W HCPCS: Performed by: OTOLARYNGOLOGY

## 2025-05-15 PROCEDURE — 83735 ASSAY OF MAGNESIUM: CPT

## 2025-05-15 PROCEDURE — 25000003 PHARM REV CODE 250: Performed by: HOSPITALIST

## 2025-05-15 PROCEDURE — 84100 ASSAY OF PHOSPHORUS: CPT

## 2025-05-15 PROCEDURE — 87070 CULTURE OTHR SPECIMN AEROBIC: CPT | Performed by: OTOLARYNGOLOGY

## 2025-05-15 PROCEDURE — 83036 HEMOGLOBIN GLYCOSYLATED A1C: CPT

## 2025-05-15 PROCEDURE — 25000003 PHARM REV CODE 250: Performed by: OTOLARYNGOLOGY

## 2025-05-15 PROCEDURE — 87075 CULTR BACTERIA EXCEPT BLOOD: CPT | Performed by: OTOLARYNGOLOGY

## 2025-05-15 PROCEDURE — 99223 1ST HOSP IP/OBS HIGH 75: CPT | Mod: ,,, | Performed by: OTOLARYNGOLOGY

## 2025-05-15 PROCEDURE — 63600175 PHARM REV CODE 636 W HCPCS: Performed by: HOSPITALIST

## 2025-05-15 PROCEDURE — 85025 COMPLETE CBC W/AUTO DIFF WBC: CPT

## 2025-05-15 RX ORDER — MUPIROCIN 20 MG/G
OINTMENT TOPICAL 2 TIMES DAILY
Status: DISCONTINUED | OUTPATIENT
Start: 2025-05-15 | End: 2025-05-17 | Stop reason: HOSPADM

## 2025-05-15 RX ORDER — HEPARIN SODIUM 5000 [USP'U]/ML
5000 INJECTION, SOLUTION INTRAVENOUS; SUBCUTANEOUS EVERY 8 HOURS
Status: DISCONTINUED | OUTPATIENT
Start: 2025-05-15 | End: 2025-05-17 | Stop reason: HOSPADM

## 2025-05-15 RX ORDER — POTASSIUM CHLORIDE 7.45 MG/ML
10 INJECTION INTRAVENOUS
Status: COMPLETED | OUTPATIENT
Start: 2025-05-15 | End: 2025-05-15

## 2025-05-15 RX ORDER — ONDANSETRON HYDROCHLORIDE 2 MG/ML
4 INJECTION, SOLUTION INTRAVENOUS EVERY 8 HOURS PRN
Status: DISCONTINUED | OUTPATIENT
Start: 2025-05-15 | End: 2025-05-17 | Stop reason: HOSPADM

## 2025-05-15 RX ORDER — HYDROXYCHLOROQUINE SULFATE 200 MG/1
200 TABLET, FILM COATED ORAL 2 TIMES DAILY
Status: DISCONTINUED | OUTPATIENT
Start: 2025-05-15 | End: 2025-05-17 | Stop reason: HOSPADM

## 2025-05-15 RX ORDER — POTASSIUM CHLORIDE 20 MEQ/1
40 TABLET, EXTENDED RELEASE ORAL ONCE
Status: DISCONTINUED | OUTPATIENT
Start: 2025-05-15 | End: 2025-05-15

## 2025-05-15 RX ORDER — MORPHINE SULFATE 4 MG/ML
4 INJECTION, SOLUTION INTRAMUSCULAR; INTRAVENOUS EVERY 4 HOURS PRN
Refills: 0 | Status: DISCONTINUED | OUTPATIENT
Start: 2025-05-15 | End: 2025-05-17 | Stop reason: HOSPADM

## 2025-05-15 RX ORDER — ACETAMINOPHEN 325 MG/1
650 TABLET ORAL EVERY 4 HOURS PRN
Status: DISCONTINUED | OUTPATIENT
Start: 2025-05-15 | End: 2025-05-17 | Stop reason: HOSPADM

## 2025-05-15 RX ORDER — MAGNESIUM SULFATE HEPTAHYDRATE 40 MG/ML
2 INJECTION, SOLUTION INTRAVENOUS ONCE
Status: COMPLETED | OUTPATIENT
Start: 2025-05-15 | End: 2025-05-15

## 2025-05-15 RX ORDER — PREDNISONE 20 MG/1
60 TABLET ORAL DAILY
Status: DISCONTINUED | OUTPATIENT
Start: 2025-05-15 | End: 2025-05-17 | Stop reason: HOSPADM

## 2025-05-15 RX ORDER — NALOXONE HCL 0.4 MG/ML
0.02 VIAL (ML) INJECTION
Status: DISCONTINUED | OUTPATIENT
Start: 2025-05-15 | End: 2025-05-17 | Stop reason: HOSPADM

## 2025-05-15 RX ORDER — POLYETHYLENE GLYCOL 3350 17 G/17G
17 POWDER, FOR SOLUTION ORAL DAILY
Status: DISCONTINUED | OUTPATIENT
Start: 2025-05-15 | End: 2025-05-17 | Stop reason: HOSPADM

## 2025-05-15 RX ORDER — SODIUM CHLORIDE 0.9 % (FLUSH) 0.9 %
3 SYRINGE (ML) INJECTION EVERY 6 HOURS PRN
Status: DISCONTINUED | OUTPATIENT
Start: 2025-05-15 | End: 2025-05-17 | Stop reason: HOSPADM

## 2025-05-15 RX ORDER — OXYCODONE HYDROCHLORIDE 5 MG/1
5 TABLET ORAL EVERY 6 HOURS PRN
Refills: 0 | Status: DISCONTINUED | OUTPATIENT
Start: 2025-05-15 | End: 2025-05-17 | Stop reason: HOSPADM

## 2025-05-15 RX ORDER — CIPROFLOXACIN AND DEXAMETHASONE 3; 1 MG/ML; MG/ML
4 SUSPENSION/ DROPS AURICULAR (OTIC) 2 TIMES DAILY
Status: DISCONTINUED | OUTPATIENT
Start: 2025-05-15 | End: 2025-05-17 | Stop reason: HOSPADM

## 2025-05-15 RX ORDER — VALACYCLOVIR HYDROCHLORIDE 500 MG/1
1000 TABLET, FILM COATED ORAL 2 TIMES DAILY
Status: DISCONTINUED | OUTPATIENT
Start: 2025-05-15 | End: 2025-05-17 | Stop reason: HOSPADM

## 2025-05-15 RX ORDER — SERTRALINE HYDROCHLORIDE 50 MG/1
100 TABLET, FILM COATED ORAL DAILY
Status: DISCONTINUED | OUTPATIENT
Start: 2025-05-15 | End: 2025-05-17 | Stop reason: HOSPADM

## 2025-05-15 RX ORDER — LANOLIN ALCOHOL/MO/W.PET/CERES
400 CREAM (GRAM) TOPICAL ONCE
Status: DISCONTINUED | OUTPATIENT
Start: 2025-05-15 | End: 2025-05-15

## 2025-05-15 RX ADMIN — HYPROMELLOSE 2910 1 DROP: 5 SOLUTION OPHTHALMIC at 03:05

## 2025-05-15 RX ADMIN — MUPIROCIN: 20 OINTMENT TOPICAL at 08:05

## 2025-05-15 RX ADMIN — PREDNISONE 60 MG: 20 TABLET ORAL at 05:05

## 2025-05-15 RX ADMIN — MAGNESIUM SULFATE HEPTAHYDRATE 2 G: 40 INJECTION, SOLUTION INTRAVENOUS at 09:05

## 2025-05-15 RX ADMIN — POTASSIUM CHLORIDE 10 MEQ: 7.46 INJECTION, SOLUTION INTRAVENOUS at 08:05

## 2025-05-15 RX ADMIN — MUPIROCIN: 20 OINTMENT TOPICAL at 09:05

## 2025-05-15 RX ADMIN — HYPROMELLOSE 2910 1 DROP: 5 SOLUTION OPHTHALMIC at 09:05

## 2025-05-15 RX ADMIN — MORPHINE SULFATE 4 MG: 4 INJECTION INTRAVENOUS at 01:05

## 2025-05-15 RX ADMIN — HYDROXYCHLOROQUINE SULFATE 200 MG: 200 TABLET ORAL at 09:05

## 2025-05-15 RX ADMIN — HEPARIN SODIUM 5000 UNITS: 5000 INJECTION INTRAVENOUS; SUBCUTANEOUS at 03:05

## 2025-05-15 RX ADMIN — HEPARIN SODIUM 5000 UNITS: 5000 INJECTION INTRAVENOUS; SUBCUTANEOUS at 09:05

## 2025-05-15 RX ADMIN — HEPARIN SODIUM 5000 UNITS: 5000 INJECTION INTRAVENOUS; SUBCUTANEOUS at 05:05

## 2025-05-15 RX ADMIN — HYDROXYCHLOROQUINE SULFATE 200 MG: 200 TABLET ORAL at 08:05

## 2025-05-15 RX ADMIN — PIPERACILLIN SODIUM AND TAZOBACTAM SODIUM 4.5 G: 4; .5 INJECTION, POWDER, FOR SOLUTION INTRAVENOUS at 10:05

## 2025-05-15 RX ADMIN — POTASSIUM CHLORIDE 10 MEQ: 7.46 INJECTION, SOLUTION INTRAVENOUS at 09:05

## 2025-05-15 RX ADMIN — ONDANSETRON 4 MG: 2 INJECTION INTRAMUSCULAR; INTRAVENOUS at 07:05

## 2025-05-15 RX ADMIN — POTASSIUM CHLORIDE 10 MEQ: 7.46 INJECTION, SOLUTION INTRAVENOUS at 12:05

## 2025-05-15 RX ADMIN — CIPROFLOXACIN AND DEXAMETHASONE 4 DROP: 3; 1 SUSPENSION/ DROPS AURICULAR (OTIC) at 09:05

## 2025-05-15 RX ADMIN — POTASSIUM CHLORIDE 10 MEQ: 7.46 INJECTION, SOLUTION INTRAVENOUS at 03:05

## 2025-05-15 RX ADMIN — POTASSIUM CHLORIDE 10 MEQ: 7.46 INJECTION, SOLUTION INTRAVENOUS at 01:05

## 2025-05-15 RX ADMIN — PIPERACILLIN SODIUM AND TAZOBACTAM SODIUM 4.5 G: 4; .5 INJECTION, POWDER, FOR SOLUTION INTRAVENOUS at 03:05

## 2025-05-15 RX ADMIN — POTASSIUM CHLORIDE 10 MEQ: 7.46 INJECTION, SOLUTION INTRAVENOUS at 11:05

## 2025-05-15 RX ADMIN — SERTRALINE HYDROCHLORIDE 100 MG: 50 TABLET ORAL at 08:05

## 2025-05-15 RX ADMIN — PIPERACILLIN SODIUM AND TAZOBACTAM SODIUM 4.5 G: 4; .5 INJECTION, POWDER, FOR SOLUTION INTRAVENOUS at 05:05

## 2025-05-15 RX ADMIN — VALACYCLOVIR HYDROCHLORIDE 1000 MG: 500 TABLET, FILM COATED ORAL at 09:05

## 2025-05-15 NOTE — ED NOTES
Report received from Sohan RN; pt resting awake in bed connected to continuous cardiac monitoring, BP cuff, & pulse ox; obvious redness and swelling noted to patient's face (lower >upper); pt also currently very [place] due to inner ear swelling/pain/fluid and using ear phone device to assist with sound amplification.

## 2025-05-15 NOTE — CONSULTS
[facility] Surg  Otorhinolaryngology-Head & Neck Surgery  Consult Note    Patient Name: Rhina Hdez  MRN: [id number]  Code Status: Full Code  Admission Date: 5/14/2025  Hospital Length of Stay: 1 days  Attending Physician: Savannah Maldonado MD  Primary Care Provider: French Hernandez MD    Consults  [place] palsy: Prednisone 60 mg for 7 days followed by 7 day taper for total of 14 days  Valtrex bid for 14 days; may need MRI iac in future and/or if not resolving as expected. No parotid mass noted on imaging nor exam  Recommend taping eye shut with paper tape to help prevent corneal abrasion     Acute otitis externa in setting of external canal stenosis: susanna placed, ciprodex started. Culture obtained of drainage    Maxillary sinus cyst: incidental finding, can be seen in patients with history of allergic rhinitis ; similar in appearance from 2020 ct with slight enlargement but appropriate for length of time     Acute otitis media and mastoid effusions: no coalescence. Discussed case with otologist and otologist also reviewed films. No concern for cholesteatoma and does not need acute surgical intervention -does not suspect acute mastoiditis. May need to broaden abx coverage with her history of lupus but she does seem to be responding to zosyn so will continue for now    Subjective:     Chief Complaint/Reason for Admission: ear pain and drainage    History of Present Illness: 39 year old female with history of lupus and hearing loss presented to er for ear pain and drainage. patient reports right ear pain that started about a week ago and then had drainage started yesterday. She also noted swelling of right facial area which has gotten better since abx. 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 Saw ent at [facility] 3-27-17 for ear itching, intermittent ear drainage and hearing changes. Found to have external otitis at that visit  and given ciprodex.   Patient also had seen ent in  and  for ear drainage and had mixed hearing loss . At  visit hpi section of note stated she had bells and that it had resolved spontaneously in 2-3 months. Note also stated she would intermittently have malodorous drainage and mri ordered to eval for hearing asymmetry, h/o bells palsy and to ensure no underlying cholesteatoma that could account for eac stenosis    Hearing test from       Medications:  Continuous Infusions:  Scheduled Meds:   artificial tears  1 drop Both Eyes TID    heparin (porcine)  5,000 Units Subcutaneous Q8H    hydroxychloroquine  200 mg Oral BID    magnesium sulfate 2 g IVPB  2 g Intravenous Once    mupirocin   Nasal BID    piperacillin-tazobactam (Zosyn) IV (PEDS and ADULTS) (extended infusion is not appropriate)  4.5 g Intravenous Q8H    polyethylene glycol  17 g Oral Daily    potassium chloride  10 mEq Intravenous Q1H    sertraline  100 mg Oral Daily     PRN Meds:  Current Facility-Administered Medications:     acetaminophen, 650 mg, Oral, Q4H PRN    morphine, 4 mg, Intravenous, Q4H PRN    naloxone, 0.02 mg, Intravenous, PRN    ondansetron, 4 mg, Intravenous, Q8H PRN    oxyCODONE, 5 mg, Oral, Q6H PRN    sodium chloride 0.9%, 3 mL, Intravenous, Q6H PRN     No current facility-administered medications on file prior to encounter.     Current Outpatient Medications on File Prior to Encounter   Medication Sig    hydroxychloroquine (PLAQUENIL) 200 mg tablet Take 1 tablet (200 mg total) by mouth 2 (two) times daily.    ketoconazole (NIZORAL) 2 % shampoo Apply topically as needed.    sertraline (ZOLOFT) 100 MG tablet Take 100 mg by mouth once daily.    traMADol (ULTRAM) 50 mg tablet TK 1 T PO  Q 6 H PRN FOR PAIN    [] amoxicillin (AMOXIL) 500 MG Tab Take 500 mg by mouth 2 (two) times daily. For seven days    artificial tears (ISOPTO TEARS) 0.5 % ophthalmic solution Place 1 drop into both eyes 3 (three) times daily.     ferrous sulfate 325 (65 FE) MG EC tablet Take 1 tablet (325 mg total) by mouth 2 (two) times daily.       Review of patient's allergies indicates:   Allergen Reactions    Sulfa (sulfonamide antibiotics) Rash       Past Medical History:   Diagnosis Date    Anemia     Asthma     Bacteremia due to methicillin resistant Staphylococcus aureus 2013    Cutaneous lupus erythematosus     Lupus 2007    Psoriasis-like skin disease 2013    SLE (systemic lupus erythematosus)     [facility]     Spongiotic psoriasiform dermatitis     Punch biopsy of L leg     Past Surgical History:   Procedure Laterality Date     SECTION, CLASSIC       Family History       Problem Relation (Age of Onset)    Breast cancer Mother (52)          Tobacco Use    Smoking status: Every Day     Current packs/day: 0.50     Average packs/day: 0.5 packs/day for 2.0 years (1.0 ttl pk-yrs)     Types: Cigarettes    Smokeless tobacco: Never   Substance and Sexual Activity    Alcohol use: Yes     Comment: on occassion    Drug use: No    Sexual activity: Yes     Partners: Male     Birth control/protection: None     Review of Systems   Constitutional:  Negative for fever.   HENT:  Positive for ear discharge, ear pain and facial swelling. Negative for sinus pain.    Respiratory:  Negative for stridor.    Musculoskeletal:  Positive for neck pain. Negative for neck stiffness.   Skin:  Negative for rash.   Neurological:  Negative for dizziness and headaches.   Hematological:  Negative for adenopathy.   Psychiatric/Behavioral:  Negative for agitation.      Objective:     Vital Signs (Most Recent):  Temp: 97.8 °F (36.6 °C) (05/15/25 0713)  Pulse: (!) 54 (05/15/25 07)  Resp: 18 (05/15/25 0713)  BP: 105/74 (05/15/25 0713)  SpO2: 99 % (05/15/25 0713) Vital Signs (24h Range):  Temp:  [97.4 °F (36.3 °C)-98.4 °F (36.9 °C)] 97.8 °F (36.6 °C)  Pulse:  [] 54  Resp:  [15-20] 18  SpO2:  [98 %-100 %] 99 %  BP: (105-192)/()  105/74     Weight: 99 kg (218 lb 4.1 oz)  Body mass index is 36.32 kg/m².        Physical Exam  HENT:      Head: Normocephalic.      Jaw: No trismus.      Right Ear: Drainage and swelling present. No mastoid tenderness.      Left Ear: Drainage and swelling present. No mastoid tenderness.      Ears:      Comments: Very hard of hearing.     Bilateral external canal stenosis but also significant edema and erythema with mucopurulent drainage- suctioned drainage after culture obtained and ear wick placed bilaterally      Mouth/Throat:      Mouth: Mucous membranes are moist.   Neck:      Trachea: Trachea normal.      Comments: Subjective ttp of mastoid on right. No erythema nor swelling over mastoid bone  Pulmonary:      Effort: Pulmonary effort is normal.      Breath sounds: No stridor.   Musculoskeletal:      Cervical back: No rigidity.   Lymphadenopathy:      Cervical: No cervical adenopathy.   Neurological:      Mental Status: She is alert.      Cranial Nerves: Facial asymmetry present.      Comments: House brackman  5/6 right- incomplete eye closure , no mouth or forehead movement. Normal left facial motion and facial appearance at rest          Significant Labs:  CBC:   Recent Labs   Lab 05/16/25  0443   WBC 6.40   RBC 4.26   HGB 11.0*   HCT 34.4*      MCV 81*   MCH 25.8*   MCHC 32.0     CMP:   Recent Labs   Lab 05/14/25  1728 05/15/25  0525 05/16/25  0443   GLU 90   < > 150*   CALCIUM 9.6   < > 9.1   ALBUMIN 3.3*  --   --    PROT 9.6*  --   --    *   < > 138   K 3.9   < > 4.3   CO2 19*   < > 21*      < > 108   BUN 14   < > 11   CREATININE 0.8   < > 0.8   ALKPHOS 85  --   --    ALT 9*  --   --    AST 17  --   --    BILITOT 0.4  --   --     < > = values in this interval not displayed.       Significant Diagnostics:  CT: I have reviewed all pertinent results/findings within the past 24 hours and my personal findings are:  left maxillary sinus cyst    Bilateral mastoid effusions without evidence of  coalescene, bilateral middle ear effusion     2020      Slight fluid vs early cholesteatoma left middle ear- scutum intact so I think less likely cholesteatoma    Assessment/Plan:     Active Diagnoses:    Diagnosis Date Noted POA    PRINCIPAL PROBLEM:  Mastoiditis with Otitis Media and Externa [H70.90] 05/15/2025 Yes    Anemia [D64.9] 05/15/2025 Yes    SLE (systemic lupus erythematosus) [M32.9] 07/30/2013 Yes     Chronic      Problems Resolved During this Admission:     VTE Risk Mitigation (From admission, onward)           Ordered     heparin (porcine) injection 5,000 Units  Every 8 hours         05/15/25 0039     Reason for No Pharmacological VTE Prophylaxis  Once        Question:  Reasons:  Answer:  Risk of Bleeding    05/15/25 0025     IP VTE HIGH RISK PATIENT  Once         05/15/25 0025     Place sequential compression device  Until discontinued         05/15/25 0025                    Thank you for your consult. I will follow-up with patient. Please contact us if you have any additional questions.    Sandi Peña MD  Otorhinolaryngology-Head & Neck Surgery  [facility] - [place], [place] - [facility]     Dr. Ravi Ortiz    Internal medicine    [address], [place] [id number] [phone]  Phone: (   )    -  Fax: (   )    -

## 2025-05-15 NOTE — NURSING
Pt received from ER, alert and oriented. On RA not in distress. VSS taken and recorded. With swelling noted on R face. Room oriented. On cardiac monitoring as ordered. HOB elevated. Bed in low position. Instructed to call for assistance. Call light within reach. Care ongoing.

## 2025-05-15 NOTE — PLAN OF CARE
Pt A&Ox4, free from falls/injury, and able to make needs known during shift. VSS. Pt had no c/o pain during shift. No acute distress noted. Bed locked and in lowest position. Bedside table and call light in reach. Will cont to monitor.  Problem: Adult Inpatient Plan of Care  Goal: Plan of Care Review  5/15/2025 1752 by Jodi Thibodeaux RN  Outcome: Progressing  5/15/2025 1751 by Jodi Thibodeaux RN  Outcome: Progressing  Goal: Patient-Specific Goal (Individualized)  5/15/2025 1752 by Jodi Thibodeaux RN  Outcome: Progressing  5/15/2025 1751 by Jodi Thibodeaux RN  Outcome: Progressing  Goal: Absence of Hospital-Acquired Illness or Injury  5/15/2025 1752 by Jodi Thibodeaux RN  Outcome: Progressing  5/15/2025 1751 by Jodi Thibodeaux RN  Outcome: Progressing  Goal: Optimal Comfort and Wellbeing  5/15/2025 1752 by Jodi Thibodeaux RN  Outcome: Progressing  5/15/2025 1751 by Jodi Thibodeaux RN  Outcome: Progressing  Goal: Readiness for Transition of Care  5/15/2025 1752 by Jodi Thibodeaux RN  Outcome: Progressing  5/15/2025 1751 by Jodi Thibodeaux RN  Outcome: Progressing     Problem: Infection  Goal: Absence of Infection Signs and Symptoms  Outcome: Progressing

## 2025-05-15 NOTE — CARE UPDATE
Ms Rhina Hdez is a 39 y.o. woman with lupus on hydroxychloroquine who was admitted with bilateral R>L otitis externa/media/mastoiditis. She has R facial droop as well. This worsened despite outpatient augmentin course. Started broad antibiotics. She is feeling a little better with less drainage from her ears. ENT consulted.     Savannah Maldonado MD  05/15/2025 11:46 AM

## 2025-05-15 NOTE — HPI
A 39-year-old F with medical history significant for SLE ( +ASHLEIGH, dsDNA), and hearing impediment who presented with right ear pain with purulent drainage of 1 week duration    Patient was in her baseline state of health until a week before presentation when she started noticing right ear pain; at that point, patient went to see her PCP who irrigated both ears and prescribed oral amoxicillin for her.  Patient completed the course of medication however symptoms progressed despite oral antibiotics which prompted her to come to the ED. there is associated scanty discharge with low-grade intermittent fever; and now both ears affected.  No nausea, vomiting, loss of consciousness, seizures.  No visual impairment or other symptoms of cavernous thrombosis or meningitis.    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  Patient received Zosyn in the ED and admission was requested for further management

## 2025-05-15 NOTE — H&P
"  [facility]  [facility]  History & Physical    Patient Name: Rhina Hdez  MRN: [id number]  Patient Class: IP- Inpatient  Admission Date: 5/14/2025  Attending Physician: Savannah Maldonado MD   Primary Care Provider: French Hernandez MD         Patient information was obtained from patient and ER records.     Subjective:     Principal Problem:Mastoiditis    Chief Complaint:   Chief Complaint   Patient presents with    Facial Droop     Pt reports having right ear pain on Wednesday, went to  and had her ears "pumped out". Pt reports Wednesday night she began having generalized headaches and drooping/paralysis/pain to right side of her face. Pt denies change in vision.         HPI: A 39-year-old F with medical history significant for SLE ( +ASHLEIGH, dsDNA), and hearing impediment who presented with right ear pain with purulent drainage of 1 week duration    Patient was in her baseline state of health until a week before presentation when she started noticing right ear pain; at that point, patient went to see her PCP who irrigated both ears and prescribed oral amoxicillin for her.  Patient completed the course of medication however symptoms progressed despite oral antibiotics which prompted her to come to the ED. there is associated scanty discharge with low-grade intermittent fever; and now both ears affected.  No nausea, vomiting, loss of consciousness, seizures.  No visual impairment or other symptoms of cavernous thrombosis or meningitis.    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  Patient received Zosyn in the ED and admission was requested for further " management    Past Medical History:   Diagnosis Date    Anemia     Asthma     Bacteremia due to methicillin resistant Staphylococcus aureus 2013    Cutaneous lupus erythematosus     Lupus 2007    Psoriasis-like skin disease 2013    SLE (systemic lupus erythematosus) 2011    [facility]     Spongiotic psoriasiform dermatitis 2012    Punch biopsy of L leg       Past Surgical History:   Procedure Laterality Date     SECTION, CLASSIC         Review of patient's allergies indicates:   Allergen Reactions    Sulfa (sulfonamide antibiotics) Rash       No current facility-administered medications on file prior to encounter.     Current Outpatient Medications on File Prior to Encounter   Medication Sig    hydroxychloroquine (PLAQUENIL) 200 mg tablet Take 1 tablet (200 mg total) by mouth 2 (two) times daily.    ketoconazole (NIZORAL) 2 % shampoo Apply topically as needed.    sertraline (ZOLOFT) 100 MG tablet Take 100 mg by mouth once daily.    traMADol (ULTRAM) 50 mg tablet TK 1 T PO  Q 6 H PRN FOR PAIN    [] amoxicillin (AMOXIL) 500 MG Tab Take 500 mg by mouth 2 (two) times daily. For seven days    artificial tears (ISOPTO TEARS) 0.5 % ophthalmic solution Place 1 drop into both eyes 3 (three) times daily.    ferrous sulfate 325 (65 FE) MG EC tablet Take 1 tablet (325 mg total) by mouth 2 (two) times daily.     Family History       Problem Relation (Age of Onset)    Breast cancer Mother (52)          Tobacco Use    Smoking status: Every Day     Current packs/day: 0.50     Average packs/day: 0.5 packs/day for 2.0 years (1.0 ttl pk-yrs)     Types: Cigarettes    Smokeless tobacco: Never   Substance and Sexual Activity    Alcohol use: Yes     Comment: on occassion    Drug use: No    Sexual activity: Yes     Partners: Male     Birth control/protection: None     Review of Systems   Constitutional:  Positive for fever. Negative for fatigue.   HENT:  Positive for ear discharge, ear pain,  facial swelling and hearing loss. Negative for drooling, mouth sores, nosebleeds, rhinorrhea, sinus pressure and sinus pain.    Eyes:  Negative for photophobia, pain, redness and visual disturbance.   Respiratory:  Negative for cough, choking and shortness of breath.    Gastrointestinal:  Negative for abdominal distention.     Objective:     Vital Signs (Most Recent):  Temp: 98.1 °F (36.7 °C) (05/15/25 0200)  Pulse: 69 (05/15/25 0200)  Resp: 18 (05/15/25 0200)  BP: (!) 124/58 (05/15/25 0200)  SpO2: 100 % (05/15/25 0200) Vital Signs (24h Range):  Temp:  [97.4 °F (36.3 °C)-98.4 °F (36.9 °C)] 98.1 °F (36.7 °C)  Pulse:  [] 69  Resp:  [15-20] 18  SpO2:  [99 %-100 %] 100 %  BP: (123-192)/() 124/58     Weight: 96.2 kg (212 lb)  Body mass index is 36.39 kg/m².     Physical Exam  Constitutional:       General: She is in acute distress.   HENT:      Head:      Comments: Tender RT external acoustic meatus     Nose: Nose normal.      Mouth/Throat:      Mouth: Mucous membranes are moist.      Pharynx: No oropharyngeal exudate or posterior oropharyngeal erythema.   Cardiovascular:      Rate and Rhythm: Normal rate and regular rhythm.   Pulmonary:      Effort: Pulmonary effort is normal. No respiratory distress.      Breath sounds: Normal breath sounds.   Abdominal:      General: There is no distension.      Palpations: Abdomen is soft. There is no mass.   Neurological:      Mental Status: She is oriented to person, place, and time.      Comments: Hard of hearing                Significant Labs: CBC:   Recent Labs   Lab 05/14/25  1728   WBC 9.40   HGB 11.2*   HCT 33.0*        CMP:   Recent Labs   Lab 05/14/25  1728   *   K 3.9      CO2 19*   GLU 90   BUN 14   CREATININE 0.8   CALCIUM 9.6   PROT 9.6*   ALBUMIN 3.3*   BILITOT 0.4   ALKPHOS 85   AST 17   ALT 9*   ANIONGAP 14       Significant Imaging:   CT Temporal Bone with Contrast   Final Result      1. No acute intracranial abnormalities identified.    2. Complete opacification of the bilateral mastoid air cells and middle ear cavities.  Such findings can be seen with potential otitis media and mastoiditis in the right clinical setting.   3. Complete occlusion of the right and partial incomplete occlusion of the left external auditory canals.  Such findings can be seen with potential otitis externa.  Clinical correlation is obviously essential.         Electronically signed by: Ronen Feliciano MD   Date:    05/14/2025   Time:    19:41      CT Head Without Contrast   Final Result      1. No acute intracranial abnormalities identified.   2. Complete opacification of the bilateral mastoid air cells and middle ear cavities.  Such findings can be seen with potential otitis media and mastoiditis in the right clinical setting.   3. Complete occlusion of the right and partial incomplete occlusion of the left external auditory canals.  Such findings can be seen with potential otitis externa.  Clinical correlation is obviously essential.         Electronically signed by: Ronen Feliciano MD   Date:    05/14/2025   Time:    19:41          Assessment/Plan:     Assessment & Plan  Mastoiditis with Otitis Media and Externa  Hx of ear pain refractory to oral antibiotics in the setting of chronic hearing impedim  CT head Complete opacification of bilateral mastoid air cells and middle ear cavities concerning for mastoiditis and occlusion of both right and left external auditory canals  Continue IV Zosyn given immunosuppression the setting of SLE; symptomatic pain control  Obtain ENT consult    SLE (systemic lupus erythematosus)    Last flare was 11/2024  Continue home hydroxychloroquine  Anemia  Mild ACD likely secondary to SLE  Monitor with daily CBC  VTE Risk Mitigation (From admission, onward)           Ordered     heparin (porcine) injection 5,000 Units  Every 8 hours         05/15/25 0039     Reason for No Pharmacological VTE Prophylaxis  Once        Question:  Reasons:   Answer:  Risk of Bleeding    05/15/25 0025     IP VTE HIGH RISK PATIENT  Once         05/15/25 0025     Place sequential compression device  Until discontinued         05/15/25 0025                                    Eli Hedrick MD  Department of Hospital Medicine  [facility] - Emergency Dept

## 2025-05-15 NOTE — SUBJECTIVE & OBJECTIVE
Past Medical History:   Diagnosis Date    Anemia     Asthma     Bacteremia due to methicillin resistant Staphylococcus aureus 2013    Cutaneous lupus erythematosus     Lupus 2007    Psoriasis-like skin disease 2013    SLE (systemic lupus erythematosus)     [facility]     Spongiotic psoriasiform dermatitis 2012    Punch biopsy of L leg       Past Surgical History:   Procedure Laterality Date     SECTION, CLASSIC         Review of patient's allergies indicates:   Allergen Reactions    Sulfa (sulfonamide antibiotics) Rash       No current facility-administered medications on file prior to encounter.     Current Outpatient Medications on File Prior to Encounter   Medication Sig    hydroxychloroquine (PLAQUENIL) 200 mg tablet Take 1 tablet (200 mg total) by mouth 2 (two) times daily.    ketoconazole (NIZORAL) 2 % shampoo Apply topically as needed.    sertraline (ZOLOFT) 100 MG tablet Take 100 mg by mouth once daily.    traMADol (ULTRAM) 50 mg tablet TK 1 T PO  Q 6 H PRN FOR PAIN    [] amoxicillin (AMOXIL) 500 MG Tab Take 500 mg by mouth 2 (two) times daily. For seven days    artificial tears (ISOPTO TEARS) 0.5 % ophthalmic solution Place 1 drop into both eyes 3 (three) times daily.    ferrous sulfate 325 (65 FE) MG EC tablet Take 1 tablet (325 mg total) by mouth 2 (two) times daily.     Family History       Problem Relation (Age of Onset)    Breast cancer Mother (52)          Tobacco Use    Smoking status: Every Day     Current packs/day: 0.50     Average packs/day: 0.5 packs/day for 2.0 years (1.0 ttl pk-yrs)     Types: Cigarettes    Smokeless tobacco: Never   Substance and Sexual Activity    Alcohol use: Yes     Comment: on occassion    Drug use: No    Sexual activity: Yes     Partners: Male     Birth control/protection: None     Review of Systems   Constitutional:  Positive for fever. Negative for fatigue.   HENT:  Positive for ear discharge, ear pain, facial swelling  and hearing loss. Negative for drooling, mouth sores, nosebleeds, rhinorrhea, sinus pressure and sinus pain.    Eyes:  Negative for photophobia, pain, redness and visual disturbance.   Respiratory:  Negative for cough, choking and shortness of breath.    Gastrointestinal:  Negative for abdominal distention.     Objective:     Vital Signs (Most Recent):  Temp: 98.1 °F (36.7 °C) (05/15/25 0200)  Pulse: 69 (05/15/25 0200)  Resp: 18 (05/15/25 0200)  BP: (!) 124/58 (05/15/25 0200)  SpO2: 100 % (05/15/25 0200) Vital Signs (24h Range):  Temp:  [97.4 °F (36.3 °C)-98.4 °F (36.9 °C)] 98.1 °F (36.7 °C)  Pulse:  [] 69  Resp:  [15-20] 18  SpO2:  [99 %-100 %] 100 %  BP: (123-192)/() 124/58     Weight: 96.2 kg (212 lb)  Body mass index is 36.39 kg/m².     Physical Exam  Constitutional:       General: She is in acute distress.   HENT:      Head:      Comments: Tender RT external acoustic meatus     Nose: Nose normal.      Mouth/Throat:      Mouth: Mucous membranes are moist.      Pharynx: No oropharyngeal exudate or posterior oropharyngeal erythema.   Cardiovascular:      Rate and Rhythm: Normal rate and regular rhythm.   Pulmonary:      Effort: Pulmonary effort is normal. No respiratory distress.      Breath sounds: Normal breath sounds.   Abdominal:      General: There is no distension.      Palpations: Abdomen is soft. There is no mass.   Neurological:      Mental Status: She is oriented to person, place, and time.      Comments: Hard of hearing                Significant Labs: CBC:   Recent Labs   Lab 05/14/25  1728   WBC 9.40   HGB 11.2*   HCT 33.0*        CMP:   Recent Labs   Lab 05/14/25  1728   *   K 3.9      CO2 19*   GLU 90   BUN 14   CREATININE 0.8   CALCIUM 9.6   PROT 9.6*   ALBUMIN 3.3*   BILITOT 0.4   ALKPHOS 85   AST 17   ALT 9*   ANIONGAP 14       Significant Imaging:   CT Temporal Bone with Contrast   Final Result      1. No acute intracranial abnormalities identified.   2. Complete  opacification of the bilateral mastoid air cells and middle ear cavities.  Such findings can be seen with potential otitis media and mastoiditis in the right clinical setting.   3. Complete occlusion of the right and partial incomplete occlusion of the left external auditory canals.  Such findings can be seen with potential otitis externa.  Clinical correlation is obviously essential.         Electronically signed by: Ronen Feliciano MD   Date:    05/14/2025   Time:    19:41      CT Head Without Contrast   Final Result      1. No acute intracranial abnormalities identified.   2. Complete opacification of the bilateral mastoid air cells and middle ear cavities.  Such findings can be seen with potential otitis media and mastoiditis in the right clinical setting.   3. Complete occlusion of the right and partial incomplete occlusion of the left external auditory canals.  Such findings can be seen with potential otitis externa.  Clinical correlation is obviously essential.         Electronically signed by: Ronen Feliciano MD   Date:    05/14/2025   Time:    19:41

## 2025-05-15 NOTE — ASSESSMENT & PLAN NOTE
Hx of ear pain refractory to oral antibiotics in the setting of chronic hearing impedim  CT head Complete opacification of bilateral mastoid air cells and middle ear cavities concerning for mastoiditis and occlusion of both right and left external auditory canals  Continue IV Zosyn given immunosuppression the setting of SLE; symptomatic pain control  Obtain ENT consult

## 2025-05-15 NOTE — PLAN OF CARE
Case Management Assessment - [facility]    PCP: French Hernandez MD     Pharmacy:   [id number] - [place], [place]  [address]  [address]  [place] [address]  Phone: [phone] Fax: [phone]    Patient Arrived From: home  Existing Help at Home:   (Boyfriend) [phone]    Roxanne Hdez (Sister)  [phone] (Mobile)       Barriers to Discharge: none    Discharge Plan:    A. Home with family   B. Home with family      TN met with patient at bedside. Discussed assessment questionnaires.  Pt is independent with ADL's at home. Does not use any DME. She lives with her boyfriend and kids. Pt stated she will drive herself home upon time of discharge. Advised patient to reach out to case management if any questions, concerns or requests for discharge needs.          05/15/25 1406   Discharge Assessment   Assessment Type Discharge Planning Assessment   Confirmed/corrected address, phone number and insurance Yes   Confirmed Demographics Correct on Facesheet   Source of Information patient   When was your last doctors appointment?   (pt stated last month)   Reason For Admission Mastoiditis with Otitis Media and Externa   People in Home significant other;child(aida), dependent   Do you have help at home or someone to help you manage your care at home? Yes   Who are your caregiver(s) and their phone number(s)? Roxanne Hdez (Sister)  [phone] (Mobile)   Current cognitive status: Alert/Oriented   Walking or Climbing Stairs Difficulty no   Dressing/Bathing Difficulty no   Equipment Currently Used at Home none   Readmission within 30 days? No   Patient currently being followed by outpatient case management? No   Do you currently have service(s) that help you manage your care at home? No   Do you take prescription medications? Yes   Do you have prescription coverage? Yes   Do you have any problems affording any of your prescribed  medications? No   Who is going to help you get home at discharge? pt stated she will drive herself   How do you get to doctors appointments? car, drives self   Are you on dialysis? No   Do you take coumadin? No   Discharge Plan A Home with family   Discharge Plan B Home with family   DME Needed Upon Discharge  none   Discharge Plan discussed with: Patient   Transition of Care Barriers None

## 2025-05-16 ENCOUNTER — TELEPHONE (OUTPATIENT)
Dept: OTOLARYNGOLOGY | Facility: CLINIC | Age: 40
End: 2025-05-16
Payer: MEDICARE

## 2025-05-16 LAB
ABSOLUTE EOSINOPHIL (OHS): 0 K/UL
ABSOLUTE MONOCYTE (OHS): 0.1 K/UL (ref 0.3–1)
ABSOLUTE NEUTROPHIL COUNT (OHS): 5.22 K/UL (ref 1.8–7.7)
ANION GAP (OHS): 9 MMOL/L (ref 8–16)
BASOPHILS # BLD AUTO: 0.01 K/UL
BASOPHILS NFR BLD AUTO: 0.2 %
BUN SERPL-MCNC: 11 MG/DL (ref 6–20)
CALCIUM SERPL-MCNC: 9.1 MG/DL (ref 8.7–10.5)
CHLORIDE SERPL-SCNC: 108 MMOL/L (ref 95–110)
CO2 SERPL-SCNC: 21 MMOL/L (ref 23–29)
CREAT SERPL-MCNC: 0.8 MG/DL (ref 0.5–1.4)
ERYTHROCYTE [DISTWIDTH] IN BLOOD BY AUTOMATED COUNT: 15.5 % (ref 11.5–14.5)
GFR SERPLBLD CREATININE-BSD FMLA CKD-EPI: >60 ML/MIN/1.73/M2
GLUCOSE SERPL-MCNC: 150 MG/DL (ref 70–110)
HCT VFR BLD AUTO: 34.4 % (ref 37–48.5)
HGB BLD-MCNC: 11 GM/DL (ref 12–16)
IMM GRANULOCYTES # BLD AUTO: 0.02 K/UL (ref 0–0.04)
IMM GRANULOCYTES NFR BLD AUTO: 0.3 % (ref 0–0.5)
LYMPHOCYTES # BLD AUTO: 1.05 K/UL (ref 1–4.8)
MAGNESIUM SERPL-MCNC: 1.7 MG/DL (ref 1.6–2.6)
MCH RBC QN AUTO: 25.8 PG (ref 27–31)
MCHC RBC AUTO-ENTMCNC: 32 G/DL (ref 32–36)
MCV RBC AUTO: 81 FL (ref 82–98)
NUCLEATED RBC (/100WBC) (OHS): 0 /100 WBC
PLATELET # BLD AUTO: 429 K/UL (ref 150–450)
PMV BLD AUTO: 10.1 FL (ref 9.2–12.9)
POTASSIUM SERPL-SCNC: 4.3 MMOL/L (ref 3.5–5.1)
RBC # BLD AUTO: 4.26 M/UL (ref 4–5.4)
RELATIVE EOSINOPHIL (OHS): 0 %
RELATIVE LYMPHOCYTE (OHS): 16.4 % (ref 18–48)
RELATIVE MONOCYTE (OHS): 1.6 % (ref 4–15)
RELATIVE NEUTROPHIL (OHS): 81.5 % (ref 38–73)
SODIUM SERPL-SCNC: 138 MMOL/L (ref 136–145)
WBC # BLD AUTO: 6.4 K/UL (ref 3.9–12.7)

## 2025-05-16 PROCEDURE — 99232 SBSQ HOSP IP/OBS MODERATE 35: CPT | Mod: ,,, | Performed by: OTOLARYNGOLOGY

## 2025-05-16 PROCEDURE — 63600175 PHARM REV CODE 636 W HCPCS: Performed by: HOSPITALIST

## 2025-05-16 PROCEDURE — 36415 COLL VENOUS BLD VENIPUNCTURE: CPT

## 2025-05-16 PROCEDURE — 83735 ASSAY OF MAGNESIUM: CPT

## 2025-05-16 PROCEDURE — 85025 COMPLETE CBC W/AUTO DIFF WBC: CPT

## 2025-05-16 PROCEDURE — 80048 BASIC METABOLIC PNL TOTAL CA: CPT

## 2025-05-16 PROCEDURE — 63600175 PHARM REV CODE 636 W HCPCS: Performed by: OTOLARYNGOLOGY

## 2025-05-16 PROCEDURE — 25000003 PHARM REV CODE 250: Performed by: HOSPITALIST

## 2025-05-16 PROCEDURE — 25000003 PHARM REV CODE 250: Performed by: OTOLARYNGOLOGY

## 2025-05-16 PROCEDURE — 94761 N-INVAS EAR/PLS OXIMETRY MLT: CPT

## 2025-05-16 PROCEDURE — 11000001 HC ACUTE MED/SURG PRIVATE ROOM

## 2025-05-16 PROCEDURE — 63600175 PHARM REV CODE 636 W HCPCS

## 2025-05-16 PROCEDURE — 25000003 PHARM REV CODE 250

## 2025-05-16 RX ORDER — TRIAMCINOLONE ACETONIDE 1 MG/G
OINTMENT TOPICAL 2 TIMES DAILY
Status: DISCONTINUED | OUTPATIENT
Start: 2025-05-16 | End: 2025-05-17 | Stop reason: HOSPADM

## 2025-05-16 RX ORDER — MAGNESIUM SULFATE HEPTAHYDRATE 40 MG/ML
2 INJECTION, SOLUTION INTRAVENOUS ONCE
Status: COMPLETED | OUTPATIENT
Start: 2025-05-16 | End: 2025-05-16

## 2025-05-16 RX ADMIN — MUPIROCIN: 20 OINTMENT TOPICAL at 08:05

## 2025-05-16 RX ADMIN — MUPIROCIN: 20 OINTMENT TOPICAL at 09:05

## 2025-05-16 RX ADMIN — HYPROMELLOSE 2910 1 DROP: 5 SOLUTION OPHTHALMIC at 08:05

## 2025-05-16 RX ADMIN — MAGNESIUM SULFATE HEPTAHYDRATE 2 G: 40 INJECTION, SOLUTION INTRAVENOUS at 09:05

## 2025-05-16 RX ADMIN — HEPARIN SODIUM 5000 UNITS: 5000 INJECTION INTRAVENOUS; SUBCUTANEOUS at 09:05

## 2025-05-16 RX ADMIN — CIPROFLOXACIN AND DEXAMETHASONE 4 DROP: 3; 1 SUSPENSION/ DROPS AURICULAR (OTIC) at 09:05

## 2025-05-16 RX ADMIN — TRIAMCINOLONE ACETONIDE: 1 OINTMENT TOPICAL at 02:05

## 2025-05-16 RX ADMIN — VALACYCLOVIR HYDROCHLORIDE 1000 MG: 500 TABLET, FILM COATED ORAL at 08:05

## 2025-05-16 RX ADMIN — HEPARIN SODIUM 5000 UNITS: 5000 INJECTION INTRAVENOUS; SUBCUTANEOUS at 02:05

## 2025-05-16 RX ADMIN — PREDNISONE 60 MG: 20 TABLET ORAL at 08:05

## 2025-05-16 RX ADMIN — CIPROFLOXACIN AND DEXAMETHASONE 4 DROP: 3; 1 SUSPENSION/ DROPS AURICULAR (OTIC) at 08:05

## 2025-05-16 RX ADMIN — HYPROMELLOSE 2910 1 DROP: 5 SOLUTION OPHTHALMIC at 02:05

## 2025-05-16 RX ADMIN — PIPERACILLIN SODIUM AND TAZOBACTAM SODIUM 4.5 G: 4; .5 INJECTION, POWDER, FOR SOLUTION INTRAVENOUS at 09:05

## 2025-05-16 RX ADMIN — MORPHINE SULFATE 4 MG: 4 INJECTION INTRAVENOUS at 03:05

## 2025-05-16 RX ADMIN — PIPERACILLIN SODIUM AND TAZOBACTAM SODIUM 4.5 G: 4; .5 INJECTION, POWDER, FOR SOLUTION INTRAVENOUS at 02:05

## 2025-05-16 RX ADMIN — PIPERACILLIN SODIUM AND TAZOBACTAM SODIUM 4.5 G: 4; .5 INJECTION, POWDER, FOR SOLUTION INTRAVENOUS at 05:05

## 2025-05-16 RX ADMIN — HYDROXYCHLOROQUINE SULFATE 200 MG: 200 TABLET ORAL at 09:05

## 2025-05-16 RX ADMIN — HYDROXYCHLOROQUINE SULFATE 200 MG: 200 TABLET ORAL at 08:05

## 2025-05-16 RX ADMIN — MORPHINE SULFATE 4 MG: 4 INJECTION INTRAVENOUS at 07:05

## 2025-05-16 RX ADMIN — ACETAMINOPHEN 650 MG: 325 TABLET ORAL at 07:05

## 2025-05-16 RX ADMIN — SERTRALINE HYDROCHLORIDE 100 MG: 50 TABLET ORAL at 08:05

## 2025-05-16 RX ADMIN — HEPARIN SODIUM 5000 UNITS: 5000 INJECTION INTRAVENOUS; SUBCUTANEOUS at 05:05

## 2025-05-16 RX ADMIN — VALACYCLOVIR HYDROCHLORIDE 1000 MG: 500 TABLET, FILM COATED ORAL at 09:05

## 2025-05-16 RX ADMIN — HYPROMELLOSE 2910 1 DROP: 5 SOLUTION OPHTHALMIC at 09:05

## 2025-05-16 NOTE — SUBJECTIVE & OBJECTIVE
Interval History: She has a headache associated with R ear pain. Still has R facial weakness. Very dry skin on hands.     Review of Systems   Constitutional:  Negative for chills and fever.   HENT:  Positive for ear discharge and ear pain.    Respiratory:  Negative for shortness of breath.    Cardiovascular:  Negative for chest pain.   Gastrointestinal:  Negative for abdominal pain.   Neurological:  Positive for facial asymmetry.   Psychiatric/Behavioral:  Negative for confusion.      Objective:     Vital Signs (Most Recent):  Temp: 97.8 °F (36.6 °C) (05/16/25 1114)  Pulse: 60 (05/16/25 1114)  Resp: 20 (05/16/25 1114)  BP: 137/72 (05/16/25 1114)  SpO2: 99 % (05/16/25 1114) Vital Signs (24h Range):  Temp:  [97.7 °F (36.5 °C)-98.4 °F (36.9 °C)] 97.8 °F (36.6 °C)  Pulse:  [60-77] 60  Resp:  [18-20] 20  SpO2:  [97 %-99 %] 99 %  BP: ()/(65-84) 137/72     Weight: 99 kg (218 lb 4.1 oz)  Body mass index is 36.32 kg/m².    Intake/Output Summary (Last 24 hours) at 5/16/2025 1130  Last data filed at 5/15/2025 1849  Gross per 24 hour   Intake 967.72 ml   Output --   Net 967.72 ml         Physical Exam  Vitals and nursing note reviewed.   Constitutional:       General: She is not in acute distress.     Appearance: She is not toxic-appearing.   HENT:      Head:      Comments: R facial swelling     Nose: Nose normal.      Mouth/Throat:      Mouth: Mucous membranes are moist.   Cardiovascular:      Rate and Rhythm: Normal rate and regular rhythm.   Pulmonary:      Effort: Pulmonary effort is normal.      Breath sounds: Normal breath sounds.      Comments: RA  Neurological:      Mental Status: She is alert.      Comments: R facial droop               Significant Labs: All pertinent labs within the past 24 hours have been reviewed.    Significant Imaging: I have reviewed all pertinent imaging results/findings within the past 24 hours.

## 2025-05-16 NOTE — PROGRESS NOTES
[facility] Medicine  Progress Note    Patient Name: Rhina Hdez  MRN: [id number]  Patient Class: IP- Inpatient   Admission Date: 5/14/2025  Length of Stay: 2 days  Attending Physician: Savannah Maldonado MD  Primary Care Provider: French Hernandez MD        Subjective     Principal Problem:Mastoiditis        HPI:  A 39-year-old F with medical history significant for SLE ( +ASHLEIGH, dsDNA), and hearing impediment who presented with right ear pain with purulent drainage of 1 week duration    Patient was in her baseline state of health until a week before presentation when she started noticing right ear pain; at that point, patient went to see her PCP who irrigated both ears and prescribed oral amoxicillin for her.  Patient completed the course of medication however symptoms progressed despite oral antibiotics which prompted her to come to the ED. there is associated scanty discharge with low-grade intermittent fever; and now both ears affected.  No nausea, vomiting, loss of consciousness, seizures.  No visual impairment or other symptoms of cavernous thrombosis or meningitis.    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  Patient received Zosyn in the ED and admission was requested for further management    Overview/Hospital Course:  Ms Rhina Hdez is a 39 y.o. woman with lupus on hydroxychloroquine who was admitted with bilateral R>L otitis externa/media/mastoiditis. She has R facial droop as well. This worsened despite outpatient augmentin course. Started broad antibiotics. She is feeling a little better with less drainage from her ears. ENT consulted, added cipro-dex gtts  and prednisone for facial palsy.     Interval History: She has a headache associated with R ear pain. Still has R facial weakness. Very dry skin on hands.     Review of Systems   Constitutional:  Negative for chills and fever.   HENT:  Positive for ear discharge and ear pain.    Respiratory:  Negative for shortness of breath.    Cardiovascular:  Negative for chest pain.   Gastrointestinal:  Negative for abdominal pain.   Neurological:  Positive for facial asymmetry.   Psychiatric/Behavioral:  Negative for confusion.      Objective:     Vital Signs (Most Recent):  Temp: 97.8 °F (36.6 °C) (05/16/25 1114)  Pulse: 60 (05/16/25 1114)  Resp: 20 (05/16/25 1114)  BP: 137/72 (05/16/25 1114)  SpO2: 99 % (05/16/25 1114) Vital Signs (24h Range):  Temp:  [97.7 °F (36.5 °C)-98.4 °F (36.9 °C)] 97.8 °F (36.6 °C)  Pulse:  [60-77] 60  Resp:  [18-20] 20  SpO2:  [97 %-99 %] 99 %  BP: ()/(65-84) 137/72     Weight: 99 kg (218 lb 4.1 oz)  Body mass index is 36.32 kg/m².    Intake/Output Summary (Last 24 hours) at 5/16/2025 1130  Last data filed at 5/15/2025 1849  Gross per 24 hour   Intake 967.72 ml   Output --   Net 967.72 ml         Physical Exam  Vitals and nursing note reviewed.   Constitutional:       General: She is not in acute distress.     Appearance: She is not toxic-appearing.   HENT:      Head:      Comments: R facial swelling     Nose: Nose normal.      Mouth/Throat:      Mouth: Mucous membranes are moist.   Cardiovascular:      Rate and Rhythm: Normal rate and regular rhythm.   Pulmonary:      Effort: Pulmonary effort is normal.      Breath sounds: Normal breath sounds.      Comments: RA  Neurological:      Mental Status: She is alert.      Comments: R facial droop               Significant Labs: All pertinent labs within the past 24 hours have been reviewed.    Significant Imaging: I have reviewed all pertinent imaging results/findings within the past 24 hours.      Assessment & Plan  Mastoiditis with Otitis Media and  Externa  Hx of ear pain refractory to oral antibiotics in the setting of chronic hearing impediment and R facial droop  CT head Complete opacification of bilateral mastoid air cells and middle ear cavities concerning for mastoiditis and occlusion of both right and left external auditory canals    - started vanc, zosyn given took course of outpatient augmentin and worsened in immunocompromised patient  - cultures currently NGTD  - ENT consulted   - continue broad antibitoics   - added prednisone burst and cipro-dex   - will need outpatient clinic follow up  SLE (systemic lupus erythematosus)  Last flare was 11/2024  Continue home hydroxychloroquine  Iron deficiency anemia, unspecified  Anemia is likely due to Iron deficiency. Most recent hemoglobin and hematocrit are listed below.  Recent Labs     05/14/25  1728 05/15/25  0525 05/16/25  0443   HGB 11.2* 9.8* 11.0*   HCT 33.0* 29.5* 34.4*     Plan  - Monitor serial CBC: Daily  - Transfuse PRBC if patient becomes hemodynamically unstable, symptomatic or H/H drops below 7/21.  - Patient has not received any PRBC transfusions to date  - Patient's anemia is currently stable  Hypomagnesemia  Patient has Abnormal Magnesium: hypomagnesemia. Will continue to monitor electrolytes closely. Will replace the affected electrolytes and repeat labs to be done after interventions completed. The patient's magnesium results have been reviewed and are listed below.  Recent Labs   Lab 05/16/25  0443   MG 1.7      VTE Risk Mitigation (From admission, onward)           Ordered     heparin (porcine) injection 5,000 Units  Every 8 hours         05/15/25 0039     Reason for No Pharmacological VTE Prophylaxis  Once        Question:  Reasons:  Answer:  Risk of Bleeding    05/15/25 0025     IP VTE HIGH RISK PATIENT  Once         05/15/25 0025     Place sequential compression device  Until discontinued         05/15/25 0025                    Discharge Planning   KAL: 5/20/2025     Code Status:  Full Code   Medical Readiness for Discharge Date:   Discharge Plan A: Home with family                        Savannah Maldonado MD  Department of Hospital Medicine   [facility]

## 2025-05-16 NOTE — PROGRESS NOTES
[facility]  Otorhinolaryngology-Head & Neck Surgery  Progress Note    Patient Name: Rhina Hdez  MRN: [id number]  Code Status: Full Code  Admission Date: 5/14/2025  Hospital Length of Stay: 2 days  Attending Physician: Savannah Maldonado MD  Primary Care Provider: French Hernandez MD    Subjective:     Interval History: mastoid pain improved but does still feel a bit discomfort periparotid and under jaw     Post-Op Info:  * No surgery found *      Hospital Day: 3      Medications:  Continuous Infusions:  Scheduled Meds:   artificial tears  1 drop Both Eyes TID    ciprofloxacin-dexAMETHasone 0.3-0.1%  4 drop Both Ears BID    heparin (porcine)  5,000 Units Subcutaneous Q8H    hydroxychloroquine  200 mg Oral BID    mupirocin   Nasal BID    piperacillin-tazobactam (Zosyn) IV (PEDS and ADULTS) (extended infusion is not appropriate)  4.5 g Intravenous Q8H    polyethylene glycol  17 g Oral Daily    predniSONE  60 mg Oral Daily    sertraline  100 mg Oral Daily    triamcinolone acetonide 0.1%   Topical (Top) BID    valACYclovir  1,000 mg Oral BID     PRN Meds:  Current Facility-Administered Medications:     acetaminophen, 650 mg, Oral, Q4H PRN    morphine, 4 mg, Intravenous, Q4H PRN    naloxone, 0.02 mg, Intravenous, PRN    ondansetron, 4 mg, Intravenous, Q8H PRN    oxyCODONE, 5 mg, Oral, Q6H PRN    sodium chloride 0.9%, 3 mL, Intravenous, Q6H PRN     Objective:     Vital Signs (24h Range):  Temp:  [97.7 °F (36.5 °C)-98.4 °F (36.9 °C)] 97.8 °F (36.6 °C)  Pulse:  [60-77] 60  Resp:  [18-20] 20  SpO2:  [97 %-99 %] 99 %  BP: ()/(65-84) 137/72       Lines/Drains/Airways       Peripheral Intravenous Line  Duration                  Peripheral IV - Single Lumen 05/14/25 1739 20 G Right Forearm 1 day         Peripheral IV - Single Lumen 05/14/25 2050 20 G Left Antecubital 1 day                    Physical Exam  HENT:      Head:      Comments: Hearing has improved compared to yesterday, does not appear to be mouth  reading as much     No mastoid pain with palpation , no subjective pain either     Ears:      Comments: Ear wick remove on right , canal edema improved, no drainage. Mild edema , canal stenosis ; unable to see TM   Left wick had fallen out. Edema improved , no drainage. Still difficult to see tympanic membrane but I think portion of it visible   Neurological:      Mental Status: She is alert.      Comments: Hb 5/6          Significant Labs:  CBC:   Recent Labs   Lab 05/16/25  0443   WBC 6.40   RBC 4.26   HGB 11.0*   HCT 34.4*      MCV 81*   MCH 25.8*   MCHC 32.0         Assessment/Plan:     Active Diagnoses:    Diagnosis Date Noted POA    PRINCIPAL PROBLEM:  Mastoiditis with Otitis Media and Externa [H70.90] 05/15/2025 Yes    SLE (systemic lupus erythematosus) [M32.9] 07/30/2013 Yes     Chronic    Hypomagnesemia [E83.42] 01/09/2013 Yes    Iron deficiency anemia, unspecified [D50.9] 12/08/2012 Yes     Chronic      Problems Resolved During this Admission:    Diagnosis Date Noted Date Resolved POA    Anemia [D64.9] 05/15/2025 05/15/2025 Yes    Hypokalemia [E87.6] 07/30/2013 05/16/2025 Yes     Ear wick on left had fallen out, removed ear wick on right. Edema significantly improved but still not able to see tympanic membrane however unclear how significant her baseline stenosis was.  I think she will be ok without another wick however with how exam looks today compared to yesterday    I think would be ok to dc leeanna after another night of abx if continues to improve  and I can follow up with her as an outpatient. Discussed with patient that I am not here over the weekend but can see her in clinic next week to check in. Recommennd ciprodex otic, oral abx, prednisone -60 mg for total of 7 days with an additional taper of prednisone over 7 days as well as 14 days of valtrex    Artificial tears and recommend taping eye shut at night  Sandi Peña MD  Otorhinolaryngology-Head & Neck Surgery  [facility]

## 2025-05-16 NOTE — ASSESSMENT & PLAN NOTE
Anemia is likely due to Iron deficiency. Most recent hemoglobin and hematocrit are listed below.  Recent Labs     05/14/25  1728 05/15/25  0525 05/16/25  0443   HGB 11.2* 9.8* 11.0*   HCT 33.0* 29.5* 34.4*     Plan  - Monitor serial CBC: Daily  - Transfuse PRBC if patient becomes hemodynamically unstable, symptomatic or H/H drops below 7/21.  - Patient has not received any PRBC transfusions to date  - Patient's anemia is currently stable

## 2025-05-16 NOTE — PLAN OF CARE
Problem: Adult Inpatient Plan of Care  Goal: Plan of Care Review  Outcome: Progressing  Flowsheets (Taken 5/16/2025 1455)  Plan of Care Reviewed With: patient  Goal: Patient-Specific Goal (Individualized)  Outcome: Progressing  Goal: Absence of Hospital-Acquired Illness or Injury  Outcome: Progressing  Intervention: Identify and Manage Fall Risk  Flowsheets (Taken 5/16/2025 1455)  Safety Promotion/Fall Prevention:   assistive device/personal item within reach   bed alarm set   medications reviewed   instructed to call staff for mobility   high risk medications identified   nonskid shoes/socks when out of bed   side rails raised x 2  Intervention: Prevent Skin Injury  Flowsheets (Taken 5/16/2025 1455)  Body Position: position changed independently  Device Skin Pressure Protection:   adhesive use limited   absorbent pad utilized/changed   pressure points protected   tubing/devices free from skin contact  Intervention: Prevent and Manage VTE (Venous Thromboembolism) Risk  Flowsheets (Taken 5/16/2025 1455)  VTE Prevention/Management:   ambulation promoted   bleeding precautions maintained   bleeding risk assessed  Intervention: Prevent Infection  Flowsheets (Taken 5/16/2025 1455)  Infection Prevention: hand hygiene promoted  Goal: Optimal Comfort and Wellbeing  Outcome: Progressing  Goal: Readiness for Transition of Care  Outcome: Progressing     Problem: Infection  Goal: Absence of Infection Signs and Symptoms  Outcome: Progressing  Intervention: Prevent or Manage Infection  Flowsheets (Taken 5/16/2025 1455)  Infection Management: aseptic technique maintained   Pt alert able to make needs known,trisha meds well,IV abx remains in progress,no s/s adverse reaction noted,reposition self q 2hrs,pain controlled by prn pain medication,POC explained,remains free from falls and pressure injuries,safety maintained,continue monitoring.

## 2025-05-16 NOTE — ASSESSMENT & PLAN NOTE
Hx of ear pain refractory to oral antibiotics in the setting of chronic hearing impediment and R facial droop  CT head Complete opacification of bilateral mastoid air cells and middle ear cavities concerning for mastoiditis and occlusion of both right and left external auditory canals    - started vanc, zosyn given took course of outpatient augmentin and worsened in immunocompromised patient  - cultures currently NGTD  - ENT consulted   - continue broad antibitoics   - added prednisone burst and cipro-dex   - will need outpatient clinic follow up

## 2025-05-16 NOTE — NURSING
[facility]  Nurses Note -- 4 Eyes      5/16/2025       Skin assessed on: Q Shift      [x] No Pressure Injuries Present    [x]Prevention Measures Documented    [] Yes LDA  for Pressure Injury Previously documented     [] Yes New Pressure Injury Discovered   [] LDA for New Pressure Injury Added      Attending RN:  Ramón Estrada RN     Second RN:  Jodi RN

## 2025-05-16 NOTE — ASSESSMENT & PLAN NOTE
Patient has Abnormal Magnesium: hypomagnesemia. Will continue to monitor electrolytes closely. Will replace the affected electrolytes and repeat labs to be done after interventions completed. The patient's magnesium results have been reviewed and are listed below.  Recent Labs   Lab 05/16/25  0443   MG 1.7

## 2025-05-16 NOTE — HOSPITAL COURSE
Ms Rhina Hdez is a 39 y.o. woman with lupus on hydroxychloroquine who was admitted with bilateral R>L otitis externa/media/mastoiditis. She has R facial droop as well. This worsened despite outpatient augmentin course. Started broad antibiotics. She is feeling a little better with less drainage from her ears. ENT consulted, added cipro-dex gtts and prednisone for facial palsy.  She had continued improvement in facial swelling and facial droop.  She had begun to be able to close her eye and was tolerating p.o..  She will discharge home on doxycycline and cefpodoxime given failure of outpatient Augmentin with associated prednisone and prednisone taper artificial tears and Valtrex as recommended by ENT.  ENT referral placed and patient was given ENT phone number to ensure outpatient follow up.

## 2025-05-16 NOTE — TELEPHONE ENCOUNTER
----- Message from Sandi Peña MD sent at 5/16/2025 12:28 PM CDT -----  Regarding: hospital follow up patient  Hi can this person be scheduled to come in to clinic for follow up - can put her on Tuesday afternoon maybe at like 3? It is follow up for ear infection - may need a hearing test but I think [facility] will [facility]

## 2025-05-16 NOTE — NURSING
"[facility] [place]es [place] -- 4 Eyes      5/16/2025       Skin assessed on: Q Shift  PT refused states "I move around"    [] No Pressure Injuries Present    []Prevention Measures Documented    [] Yes LDA  for Pressure Injury Previously documented     [] Yes New Pressure Injury Discovered   [] LDA for New Pressure Injury Added      Attending RN:  Reema Olivia LPN     Second RN:  TESHA Melgar      "

## 2025-05-16 NOTE — PLAN OF CARE
Problem: Adult Inpatient Plan of Care  Goal: Plan of Care Review  Outcome: Progressing  Flowsheets (Taken 5/16/2025 0353)  Plan of Care Reviewed With: patient  Goal: Optimal Comfort and Wellbeing  Outcome: Progressing  Intervention: Monitor Pain and Promote Comfort  Flowsheets (Taken 5/16/2025 0353)  Pain Management Interventions:   pillow support provided   quiet environment facilitated     Problem: Infection  Goal: Absence of Infection Signs and Symptoms  Outcome: Progressing

## 2025-05-17 VITALS
BODY MASS INDEX: 36.36 KG/M2 | HEART RATE: 67 BPM | RESPIRATION RATE: 21 BRPM | SYSTOLIC BLOOD PRESSURE: 140 MMHG | TEMPERATURE: 98 F | DIASTOLIC BLOOD PRESSURE: 84 MMHG | HEIGHT: 65 IN | WEIGHT: 218.25 LBS | OXYGEN SATURATION: 96 %

## 2025-05-17 LAB
ABSOLUTE EOSINOPHIL (OHS): 0 K/UL
ABSOLUTE MONOCYTE (OHS): 0.87 K/UL (ref 0.3–1)
ABSOLUTE NEUTROPHIL COUNT (OHS): 8.9 K/UL (ref 1.8–7.7)
ANION GAP (OHS): 7 MMOL/L (ref 8–16)
BASOPHILS # BLD AUTO: 0.02 K/UL
BASOPHILS NFR BLD AUTO: 0.2 %
BUN SERPL-MCNC: 14 MG/DL (ref 6–20)
CALCIUM SERPL-MCNC: 8.6 MG/DL (ref 8.7–10.5)
CHLORIDE SERPL-SCNC: 109 MMOL/L (ref 95–110)
CO2 SERPL-SCNC: 22 MMOL/L (ref 23–29)
CREAT SERPL-MCNC: 0.9 MG/DL (ref 0.5–1.4)
ERYTHROCYTE [DISTWIDTH] IN BLOOD BY AUTOMATED COUNT: 15.7 % (ref 11.5–14.5)
GFR SERPLBLD CREATININE-BSD FMLA CKD-EPI: >60 ML/MIN/1.73/M2
GLUCOSE SERPL-MCNC: 113 MG/DL (ref 70–110)
HCT VFR BLD AUTO: 28.6 % (ref 37–48.5)
HGB BLD-MCNC: 9.2 GM/DL (ref 12–16)
IMM GRANULOCYTES # BLD AUTO: 0.05 K/UL (ref 0–0.04)
IMM GRANULOCYTES NFR BLD AUTO: 0.4 % (ref 0–0.5)
LYMPHOCYTES # BLD AUTO: 2.29 K/UL (ref 1–4.8)
MAGNESIUM SERPL-MCNC: 1.8 MG/DL (ref 1.6–2.6)
MCH RBC QN AUTO: 26.1 PG (ref 27–31)
MCHC RBC AUTO-ENTMCNC: 32.2 G/DL (ref 32–36)
MCV RBC AUTO: 81 FL (ref 82–98)
NUCLEATED RBC (/100WBC) (OHS): 0 /100 WBC
PLATELET # BLD AUTO: 379 K/UL (ref 150–450)
PMV BLD AUTO: 9.5 FL (ref 9.2–12.9)
POTASSIUM SERPL-SCNC: 4.2 MMOL/L (ref 3.5–5.1)
RBC # BLD AUTO: 3.53 M/UL (ref 4–5.4)
RELATIVE EOSINOPHIL (OHS): 0 %
RELATIVE LYMPHOCYTE (OHS): 18.9 % (ref 18–48)
RELATIVE MONOCYTE (OHS): 7.2 % (ref 4–15)
RELATIVE NEUTROPHIL (OHS): 73.3 % (ref 38–73)
SODIUM SERPL-SCNC: 138 MMOL/L (ref 136–145)
WBC # BLD AUTO: 12.13 K/UL (ref 3.9–12.7)

## 2025-05-17 PROCEDURE — 63600175 PHARM REV CODE 636 W HCPCS: Performed by: OTOLARYNGOLOGY

## 2025-05-17 PROCEDURE — 85025 COMPLETE CBC W/AUTO DIFF WBC: CPT

## 2025-05-17 PROCEDURE — 63600175 PHARM REV CODE 636 W HCPCS

## 2025-05-17 PROCEDURE — 36415 COLL VENOUS BLD VENIPUNCTURE: CPT

## 2025-05-17 PROCEDURE — 25000003 PHARM REV CODE 250

## 2025-05-17 PROCEDURE — 83735 ASSAY OF MAGNESIUM: CPT

## 2025-05-17 PROCEDURE — 25000003 PHARM REV CODE 250: Performed by: OTOLARYNGOLOGY

## 2025-05-17 PROCEDURE — 80048 BASIC METABOLIC PNL TOTAL CA: CPT

## 2025-05-17 RX ORDER — DOXYCYCLINE 100 MG/1
100 CAPSULE ORAL 2 TIMES DAILY
Qty: 14 CAPSULE | Refills: 0 | Status: SHIPPED | OUTPATIENT
Start: 2025-05-17 | End: 2025-05-17

## 2025-05-17 RX ORDER — VALACYCLOVIR HYDROCHLORIDE 1 G/1
1000 TABLET, FILM COATED ORAL 2 TIMES DAILY
Qty: 22 TABLET | Refills: 0 | Status: SHIPPED | OUTPATIENT
Start: 2025-05-17 | End: 2025-05-28

## 2025-05-17 RX ORDER — CIPROFLOXACIN AND DEXAMETHASONE 3; 1 MG/ML; MG/ML
4 SUSPENSION/ DROPS AURICULAR (OTIC) 2 TIMES DAILY
Qty: 7.5 ML | Refills: 0 | Status: SHIPPED | OUTPATIENT
Start: 2025-05-17 | End: 2025-05-31

## 2025-05-17 RX ORDER — PREDNISONE 20 MG/1
TABLET ORAL
Qty: 22 TABLET | Refills: 0 | Status: SHIPPED | OUTPATIENT
Start: 2025-05-17 | End: 2025-05-17

## 2025-05-17 RX ORDER — PREDNISONE 20 MG/1
TABLET ORAL
Qty: 22 TABLET | Refills: 0 | Status: SHIPPED | OUTPATIENT
Start: 2025-05-22 | End: 2025-05-17

## 2025-05-17 RX ORDER — PREDNISONE 20 MG/1
TABLET ORAL
Qty: 22 TABLET | Refills: 0 | Status: SHIPPED | OUTPATIENT
Start: 2025-05-18 | End: 2025-05-29

## 2025-05-17 RX ORDER — DOXYCYCLINE 100 MG/1
100 CAPSULE ORAL 2 TIMES DAILY
Qty: 20 CAPSULE | Refills: 0 | Status: SHIPPED | OUTPATIENT
Start: 2025-05-17

## 2025-05-17 RX ORDER — CEFDINIR 300 MG/1
300 CAPSULE ORAL 2 TIMES DAILY
Qty: 14 CAPSULE | Refills: 0 | Status: SHIPPED | OUTPATIENT
Start: 2025-05-17 | End: 2025-05-27

## 2025-05-17 RX ADMIN — SERTRALINE HYDROCHLORIDE 100 MG: 50 TABLET ORAL at 08:05

## 2025-05-17 RX ADMIN — HYDROXYCHLOROQUINE SULFATE 200 MG: 200 TABLET ORAL at 08:05

## 2025-05-17 RX ADMIN — PREDNISONE 60 MG: 20 TABLET ORAL at 08:05

## 2025-05-17 RX ADMIN — TRIAMCINOLONE ACETONIDE: 1 OINTMENT TOPICAL at 08:05

## 2025-05-17 RX ADMIN — CIPROFLOXACIN AND DEXAMETHASONE 4 DROP: 3; 1 SUSPENSION/ DROPS AURICULAR (OTIC) at 08:05

## 2025-05-17 RX ADMIN — OXYCODONE 5 MG: 5 TABLET ORAL at 10:05

## 2025-05-17 RX ADMIN — VALACYCLOVIR HYDROCHLORIDE 1000 MG: 500 TABLET, FILM COATED ORAL at 08:05

## 2025-05-17 RX ADMIN — HYPROMELLOSE 2910 1 DROP: 5 SOLUTION OPHTHALMIC at 08:05

## 2025-05-17 RX ADMIN — MUPIROCIN: 20 OINTMENT TOPICAL at 08:05

## 2025-05-17 RX ADMIN — ACETAMINOPHEN 650 MG: 325 TABLET ORAL at 01:05

## 2025-05-17 RX ADMIN — HEPARIN SODIUM 5000 UNITS: 5000 INJECTION INTRAVENOUS; SUBCUTANEOUS at 05:05

## 2025-05-17 RX ADMIN — PIPERACILLIN SODIUM AND TAZOBACTAM SODIUM 4.5 G: 4; .5 INJECTION, POWDER, FOR SOLUTION INTRAVENOUS at 05:05

## 2025-05-17 NOTE — DISCHARGE SUMMARY
[facility] Medicine  Discharge Summary      Patient Name: Rhina Hdez  MRN: [id number]  [facility]: [id number]  Patient Class: IP- Inpatient  Admission Date: 5/14/2025  Hospital Length of Stay: 3 days  Discharge Date and Time: 05/17/2025 11:24 AM  Attending Physician: Kolton Lynn, *   Discharging Provider: French Prince PA-C  Primary Care Provider: French Hernandez MD    Primary Care Team: FRENCH PRINCE    HPI:   A 39-year-old F with medical history significant for SLE ( +ASHLEIGH, dsDNA), and hearing impediment who presented with right ear pain with purulent drainage of 1 week duration    Patient was in her baseline state of health until a week before presentation when she started noticing right ear pain; at that point, patient went to see her PCP who irrigated both ears and prescribed oral amoxicillin for her.  Patient completed the course of medication however symptoms progressed despite oral antibiotics which prompted her to come to the ED. there is associated scanty discharge with low-grade intermittent fever; and now both ears affected.  No nausea, vomiting, loss of consciousness, seizures.  No visual impairment or other symptoms of cavernous thrombosis or meningitis.    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  Patient received Zosyn in the ED and admission was requested for further management    * No surgery found *      Hospital Course:   Ms Rhina Hdez is a 39 y.o. woman with lupus on hydroxychloroquine who was admitted with bilateral R>L otitis externa/media/mastoiditis. She has R facial droop as well. This worsened despite outpatient augmentin course.  Started broad antibiotics. She is feeling a little better with less drainage from her ears. ENT consulted, added cipro-dex gtts and prednisone for facial palsy.      Goals of Care Treatment Preferences:  Code Status: Full Code      SDOH Screening:  The patient declined to be screened for utility difficulties, food insecurity, transport difficulties, housing insecurity, and interpersonal safety, so no concerns could be identified this admission.     Consults:   Consults (From admission, onward)          Status Ordering Provider     Inpatient consult to ENT  Once        Provider:  Sandi Peña MD    Acknowledged NIKHIL MANRIQUEZ N            Assessment & Plan  Mastoiditis with Otitis Media and Externa  Hx of ear pain refractory to oral antibiotics in the setting of chronic hearing impediment and R facial droop  CT head Complete opacification of bilateral mastoid air cells and middle ear cavities concerning for mastoiditis and occlusion of both right and left external auditory canals    - started vanc, zosyn given took course of outpatient augmentin and worsened in immunocompromised patient  - cultures currently NGTD  - ENT consulted   - continue broad antibitoics   - added prednisone burst and cipro-dex   - will need outpatient clinic follow up  SLE (systemic lupus erythematosus)  Last flare was 11/2024  Continue home hydroxychloroquine  Iron deficiency anemia, unspecified  Anemia is likely due to Iron deficiency. Most recent hemoglobin and hematocrit are listed below.  Recent Labs     05/15/25  0525 05/16/25  0443 05/17/25  0420   HGB 9.8* 11.0* 9.2*   HCT 29.5* 34.4* 28.6*     Plan  - Monitor serial CBC: Daily  - Transfuse PRBC if patient becomes hemodynamically unstable, symptomatic or H/H drops below 7/21.  - Patient has not received any PRBC transfusions to date  - Patient's anemia is currently stable  Hypomagnesemia  Patient has Abnormal Magnesium: hypomagnesemia. Will continue to monitor electrolytes  closely. Will replace the affected electrolytes and repeat labs to be done after interventions completed. The patient's magnesium results have been reviewed and are listed below.  Recent Labs   Lab 05/17/25  0420   MG 1.8      Final Active Diagnoses:    Diagnosis Date Noted POA    PRINCIPAL PROBLEM:  Mastoiditis with Otitis Media and Externa [H70.90] 05/15/2025 Yes    SLE (systemic lupus erythematosus) [M32.9] 07/30/2013 Yes     Chronic    Hypomagnesemia [E83.42] 01/09/2013 Yes    Iron deficiency anemia, unspecified [D50.9] 12/08/2012 Yes     Chronic      Problems Resolved During this Admission:    Diagnosis Date Noted Date Resolved POA    Anemia [D64.9] 05/15/2025 05/15/2025 Yes    Hypokalemia [E87.6] 07/30/2013 05/16/2025 Yes       Discharged Condition: stable    Disposition: Home or Self Care    Follow Up:   Follow-up Information       Sandi Peña MD. Call in 1 day(s).    Specialty: Otolaryngology  Contact information:  [facility] [address] [place] [address]  [phone]                           Patient Instructions:      Ambulatory referral/consult to ENT   Standing Status: Future   Referral Priority: Routine Referral Type: Consultation   Referral Reason: Specialty Services Required   Requested Specialty: Otolaryngology   Number of Visits Requested: 1     Diet Adult Regular     Notify your health care provider if you experience any of the following:  temperature >100.4     Notify your health care provider if you experience any of the following:  persistent nausea and vomiting or diarrhea     Notify your health care provider if you experience any of the following:  increased confusion or weakness     Notify your health care provider if you experience any of the following:  persistent dizziness, light-headedness, or visual disturbances     Activity as tolerated       Significant Diagnostic Studies: Labs: CMP   Recent Labs   Lab 05/16/25  0443 05/17/25  0420    138   K 4.3 4.2    109   CO2 21* 22*    * 113*   BUN 11 14   CREATININE 0.8 0.9   CALCIUM 9.1 8.6*   ANIONGAP 9 7*    and CBC   Recent Labs   Lab 05/16/25  0443 05/17/25  0420   WBC 6.40 12.13   HGB 11.0* 9.2*   HCT 34.4* 28.6*    379       Pending Diagnostic Studies:       None           Medications:  Reconciled Home Medications:      Medication List        START taking these medications      cefdinir 300 MG capsule  Commonly known as: OMNICEF  Take 1 capsule (300 mg total) by mouth 2 (two) times daily. for 10 days  Notes to patient: Antibiotic     ciprofloxacin-dexAMETHasone 0.3-0.1% 0.3-0.1 % Drps  Commonly known as: CIPRODEX  Place 4 drops into both ears 2 (two) times daily. for 14 days  Notes to patient: Antibiotic/Steroid eardrop     doxycycline 100 MG Cap  Commonly known as: VIBRAMYCIN  Take 1 capsule (100 mg total) by mouth 2 (two) times daily.  Notes to patient: Antibiotic     predniSONE 20 MG tablet  Commonly known as: DELTASONE  Take 3 tablets (60 mg total) by mouth once daily for 4 days, THEN 2 tablets (40 mg total) once daily for 3 days, THEN 1 tablet (20 mg total) once daily for 3 days, THEN 0.5 tablets (10 mg total) once daily for 1 day.  Start taking on: May 18, 2025  Notes to patient: STEROID:  ANTI-INFLAMMATORY     valACYclovir 1000 MG tablet  Commonly known as: VALTREX  Take 1 tablet (1,000 mg total) by mouth 2 (two) times daily. for 11 days  Notes to patient: Antiviral            CONTINUE taking these medications      artificial tears 0.5 % ophthalmic solution  Commonly known as: ISOPTO TEARS  Place 1 drop into both eyes 3 (three) times daily.     ferrous sulfate 325 (65 FE) MG EC tablet  Take 1 tablet (325 mg total) by mouth 2 (two) times daily.     hydroxychloroquine 200 mg tablet  Commonly known as: PLAQUENIL  Take 1 tablet (200 mg total) by mouth 2 (two) times daily.     ketoconazole 2 % shampoo  Commonly known as: NIZORAL  Apply topically as needed.     sertraline 100 MG tablet  Commonly known as: ZOLOFT  Take 100  mg by mouth once daily.     traMADoL 50 mg tablet  Commonly known as: ULTRAM  TK 1 T PO  Q 6 H PRN FOR PAIN            STOP taking these medications      amoxicillin 500 MG Tab  Commonly known as: AMOXIL              Indwelling Lines/Drains at time of discharge:   Lines/Drains/Airways       None                   Time spent on the discharge of patient: 37 minutes         French Prince PA-C  Department of Hospital Medicine  [facility]

## 2025-05-17 NOTE — PLAN OF CARE
Case Management Final Discharge Note    Discharge Disposition: Home or Self care    New DME ordered / company name: None    Relevant SDOH / Transition of Care Barriers:  None    Person available to provide assistance at home when needed and their contact information: Roxanne Hdez [phone]    Scheduled followup appointment: ENT- Dr. Sandi Peña 5/20/2025 @ 3:15 pm. Pt to call PCP to schedule a hospital follow up within 2 weeks - added to AVS.     Referrals placed: ENT referral    Transportation: Private vehicle    Patient and family educated on discharge services and updated on DC plan. Bedside RN Reema notified, patient clear to discharge from Case Management Perspective.    05/17/25 0900   Final Note   Assessment Type Final Discharge Note   Anticipated Discharge Disposition Home   What phone number can be called within the next 1-3 days to see how you are doing after discharge? [phone]   Hospital Resources/Appts/Education Provided Appointments scheduled and added to AVS   Post-Acute Status   Discharge Delays None known at this time

## 2025-05-17 NOTE — PLAN OF CARE
Pt free from fall/injury all night. Alert and oriented. On RA not in distress. Due meds given, pt tolerated well. Headache noted, tylenol given as prn. Pt's need attended. Safety precaution done. Bed in low position. Instructed to call for assistance. Call light within reach. Care ongoing.     Problem: Adult Inpatient Plan of Care  Goal: Plan of Care Review  Outcome: Progressing  Flowsheets (Taken 5/17/2025 0352)  Plan of Care Reviewed With: patient  Goal: Optimal Comfort and Wellbeing  Outcome: Progressing  Intervention: Monitor Pain and Promote Comfort  Flowsheets (Taken 5/17/2025 0352)  Pain Management Interventions:   pillow support provided   quiet environment facilitated   medication offered     Problem: Infection  Goal: Absence of Infection Signs and Symptoms  Outcome: Progressing

## 2025-05-17 NOTE — ASSESSMENT & PLAN NOTE
Patient has Abnormal Magnesium: hypomagnesemia. Will continue to monitor electrolytes closely. Will replace the affected electrolytes and repeat labs to be done after interventions completed. The patient's magnesium results have been reviewed and are listed below.  Recent Labs   Lab 05/17/25  0420   MG 1.8

## 2025-05-17 NOTE — NURSING
"Pt refused pt transport states "it's taking to long,pt now leaving unit with staff escorting her,QUOC.  "

## 2025-05-17 NOTE — NURSING
[facility], [facility]  Nurses Note -- 4 Eyes      5/17/2025       Skin assessed on: Q Shift      [x] No Pressure Injuries Present    [x]Prevention Measures Documented    [] Yes LDA  for Pressure Injury Previously documented     [] Yes New Pressure Injury Discovered   [] LDA for New Pressure Injury Added      Attending RN:  Reema Olivia LPN     Second RN:  TESHA Melgar

## 2025-05-17 NOTE — NURSING
[facility], [facility] [place]  Nurses Note -- 4 Eyes      5/16/2025       Skin assessed on: Q Shift      [x] No Pressure Injuries Present    [x]Prevention Measures Documented    [] Yes LDA  for Pressure Injury Previously documented     [] Yes New Pressure Injury Discovered   [] LDA for New Pressure Injury Added      Attending RN:  Ramón Estrada RN     Second RN:  JARAD Benavidez

## 2025-05-17 NOTE — DISCHARGE INSTRUCTIONS
Our goal at [facility] is to always give you outstanding care and exceptional service. You may receive a survey from [employer] by mail, text or e-mail in the next 24-48 hours asking about the care you received with us. The survey should only take 5-10 minutes to complete and is very important to us.     Your feedback provides us with a way to recognize our staff who work tirelessly to provide the best care! Also, your responses help us learn how to improve when your experience was below our aspiration of excellence. We are always looking for ways to improve your stay. We WILL use your feedback to continue making improvements to help us provide the highest quality care. We keep your personal information and feedback confidential. We appreciate your time completing this survey and can't wait to hear from you!!!    We look forward to your continued care with us! Thanks so much for choosing [facility] for your healthcare needs!

## 2025-05-17 NOTE — ASSESSMENT & PLAN NOTE
Anemia is likely due to Iron deficiency. Most recent hemoglobin and hematocrit are listed below.  Recent Labs     05/15/25  0525 05/16/25  0443 05/17/25  0420   HGB 9.8* 11.0* 9.2*   HCT 29.5* 34.4* 28.6*     Plan  - Monitor serial CBC: Daily  - Transfuse PRBC if patient becomes hemodynamically unstable, symptomatic or H/H drops below 7/21.  - Patient has not received any PRBC transfusions to date  - Patient's anemia is currently stable

## 2025-05-18 LAB — BACTERIA SPEC AEROBE CULT: NORMAL

## 2025-05-19 LAB
BACTERIA BLD CULT: NORMAL
BACTERIA SPEC ANAEROBE CULT: NORMAL

## 2025-05-20 ENCOUNTER — OFFICE VISIT (OUTPATIENT)
Dept: OTOLARYNGOLOGY | Facility: CLINIC | Age: 40
End: 2025-05-20
Payer: MEDICARE

## 2025-05-20 VITALS
HEIGHT: 65 IN | BODY MASS INDEX: 36.36 KG/M2 | WEIGHT: 218.25 LBS | SYSTOLIC BLOOD PRESSURE: 150 MMHG | DIASTOLIC BLOOD PRESSURE: 89 MMHG

## 2025-05-20 DIAGNOSIS — H90.6 MIXED HEARING LOSS, BILATERAL: ICD-10-CM

## 2025-05-20 DIAGNOSIS — H65.491 CHRONIC MIDDLE EAR EFFUSION, RIGHT: ICD-10-CM

## 2025-05-20 DIAGNOSIS — H61.303 STENOSIS OF BOTH EXTERNAL AUDITORY CANALS: ICD-10-CM

## 2025-05-20 DIAGNOSIS — G51.0 FACIAL NERVE PARALYSIS: Primary | ICD-10-CM

## 2025-05-20 DIAGNOSIS — H74.93 MASTOID DISORDER, BILATERAL: ICD-10-CM

## 2025-05-20 LAB — BACTERIA BLD CULT: NORMAL

## 2025-05-20 PROCEDURE — 3079F DIAST BP 80-89 MM HG: CPT | Mod: CPTII,S$GLB,, | Performed by: OTOLARYNGOLOGY

## 2025-05-20 PROCEDURE — 3008F BODY MASS INDEX DOCD: CPT | Mod: CPTII,S$GLB,, | Performed by: OTOLARYNGOLOGY

## 2025-05-20 PROCEDURE — 99214 OFFICE O/P EST MOD 30 MIN: CPT | Mod: S$GLB,,, | Performed by: OTOLARYNGOLOGY

## 2025-05-20 PROCEDURE — 3077F SYST BP >= 140 MM HG: CPT | Mod: CPTII,S$GLB,, | Performed by: OTOLARYNGOLOGY

## 2025-05-20 PROCEDURE — 3044F HG A1C LEVEL LT 7.0%: CPT | Mod: CPTII,S$GLB,, | Performed by: OTOLARYNGOLOGY

## 2025-05-20 PROCEDURE — 1111F DSCHRG MED/CURRENT MED MERGE: CPT | Mod: CPTII,S$GLB,, | Performed by: OTOLARYNGOLOGY

## 2025-05-20 NOTE — PROGRESS NOTES
OTOLARYNGOLOGY CLINIC NOTE  Date:  2025     Chief complaint:  Chief Complaint   Patient presents with    Hospital Follow Up     Ear infection    bells palsey       History of Present Illness  Rhina Hdez is a 39 y.o. female  presenting today for a followup.    I saw her in the hospital where she presented with ear pain and drainage and acute bells palsy. She has complicated ear history       Past Medical History  Past Medical History:   Diagnosis Date    Anemia     Asthma     Bacteremia due to methicillin resistant Staphylococcus aureus 2013    Cutaneous lupus erythematosus     Lupus 2007    Psoriasis-like skin disease 2013    SLE (systemic lupus erythematosus)     follow[facility] Dr. Moon [facility]     Spongiotic psoriasiform dermatitis     Punch biopsy of L leg        Past Surgical History  Past Surgical History:   Procedure Laterality Date     SECTION, CLASSIC          Medications  Medications Ordered Prior to Encounter[1]    Review of Systems  Review of Systems   Constitutional: Negative.    HENT:  Positive for hearing loss.    Eyes:  Positive for photophobia.   Cardiovascular: Negative.    Gastrointestinal:  Positive for diarrhea.   Genitourinary: Negative.    Musculoskeletal:  Positive for back pain.   Skin:  Positive for rash.   Neurological:  Positive for headaches.   Psychiatric/Behavioral: Negative.        Answers submitted by the patient for this visit:  Review of Symptoms Questionnaire  (Submitted on 2025)  Eye Drainage?: Yes  Snoring?: Yes  Muscle aches / pain?: Yes  Cold all of the time? : Yes      Social History   reports that she has been smoking cigarettes. She has a 1 pack-year smoking history. She has never used smokeless tobacco. She reports current alcohol use. She reports that she does not use drugs.     Family History  Family History   Problem Relation Name Age of Onset    Breast cancer Mother  52        passed at age 52        Physical Exam   Vitals:     05/20/25 1500   BP: (!) 150/89    Body mass index is 36.32 kg/m².            GENERAL: no acute distress.  HEAD: normocephalic.  No mastoid tenderness   EYES: No scleral icterus  EARS: external ear without lesion, normal pinna shape and position.  External auditory canal with dry flakes , no edema. Unable to see tympanic membrane   NOSE: external nose without significant bony abnormality  ORAL CAVITY/OROPHARYNX: tongue mobile.   NECK: trachea midline.   LYMPH NODES:No cervical lymphadenopathy.  RESPIRATORY: no stridor, no stertor. Voice normal. Respirations nonlabored.  NEURO: alert, responds to questions appropriately.  Incomplete eye closure with effort, no forehead motion, asymmetric smile   PSYCH:mood appropriate      Imaging:  The patient does not have any new imaging of the head and neck since last visit.     Labs:  CBC  Recent Labs   Lab 05/15/25  0525 05/16/25  0443 05/17/25  0420   WBC 6.22 6.40 12.13   HGB 9.8 L 11.0 L 9.2 L   HCT 29.5 L 34.4 L 28.6 L   MCV 80 L 81 L 81 L   Platelet Count 392 429 379     BMP  Recent Labs   Lab 05/15/25  0525 05/16/25  0443 05/17/25  0420   Glucose 86 150 H 113 H   Sodium 140 138 138   Potassium 3.1 L 4.3 4.2   Chloride 105 108 109   CO2 24 21 L 22 L   BUN 12 11 14   Creatinine 0.9 0.8 0.9   Calcium 8.7 9.1 8.6 L   Phosphorus Level 4.1  --   --    Magnesium  1.5 L 1.7 1.8     COAGS        Assessment  1. Mastoid disorder, bilateral  - Ambulatory referral/consult to ENT    2. 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 Chronic middle ear effusion, right    5. Mixed hearing loss, bilateral       Plan:  Discussed plan of care with patient in detail and all questions answered. Patient reported understanding of plan of care. I gave the patient the opportunity to ask questions and patient confirmed all questions answered to satisfaction.     Will refer to [facility] OT for facial nerve  therapy. She is taking steroid and valtrex as prescribed. I advised her to Tape eye and get eye patch/eye chamber. She has been wearing sunglasses to help. I demonstrated to her appropriate way to tape eye. We discussed that may need gold weight and discussed about potential for corneal ulcer/abrasion and symptoms of this. Consider ENOG if not improving , would want to obtain after day 7 but before day 21 ideally. I do think her face shows slight improvement regarding lower face at rest ( not as asymmetric at rest as before but not a drastic difference)    Ear canals completely stenosed - discussed with her about canalplasty - had been discussed with her per notes from [facility] in the past. I would have her see otologist for that but I do agree would be at higher risk for restenosis especially with infection. I had reviewed her imaging and case with otologist when she was in the hospital as well. No overt evidence of cholesteatoma. Does not get drainage problem often    I advised her to notify me if face not improving after has completed steroid course. May need to get enog as mentioned above and/or have her see otology. May also need to obtain MRI   Will need updated hearing test as well. No audiology apptmt available today    F/u 2 months unless not improving. She is comfortable with messaging me if face not getting better.     I spent a total of 30 minutes on the day of the visit   This includes face to face time and non-face to face time preparing to see the patient (eg, review of tests), obtaining and/or reviewing separately obtained history, documenting clinical information in the electronic or other health record, independently interpreting results and communicating results to the patient/family/caregiver, or care coordinator.   Please be aware that this note has been generated with the assistance of [employer] voice-to-text.  Please excuse any spelling or grammatical errors.               [1]   Current Outpatient  Medications on File Prior to Visit   Medication Sig Dispense Refill    artificial tears (ISOPTO TEARS) 0.5 % ophthalmic solution Place 1 drop into both eyes 3 (three) times daily. 15 mL 3    cefdinir (OMNICEF) 300 MG capsule Take 1 capsule (300 mg total) by mouth 2 (two) times daily. for 10 days 14 capsule 0    ciprofloxacin-dexAMETHasone 0.3-0.1% (CIPRODEX) 0.3-0.1 % DrpS Place 4 drops into both ears 2 (two) times daily. for 14 days 7.5 mL 0    doxycycline (VIBRAMYCIN) 100 MG Cap Take 1 capsule (100 mg total) by mouth 2 (two) times daily. 20 capsule 0    ferrous sulfate 325 (65 FE) MG EC tablet Take 1 tablet (325 mg total) by mouth 2 (two) times daily. 60 tablet 6    hydroxychloroquine (PLAQUENIL) 200 mg tablet Take 1 tablet (200 mg total) by mouth 2 (two) times daily. 60 tablet 6    ketoconazole (NIZORAL) 2 % shampoo Apply topically as needed. 120 mL 1    predniSONE (DELTASONE) 20 MG tablet Take 3 tablets (60 mg total) by mouth once daily for 4 days, THEN 2 tablets (40 mg total) once daily for 3 days, THEN 1 tablet (20 mg total) once daily for 3 days, THEN 0.5 tablets (10 mg total) once daily for 1 day. 22 tablet 0    sertraline (ZOLOFT) 100 MG tablet Take 100 mg by mouth once daily.      traMADol (ULTRAM) 50 mg tablet TK 1 T PO  Q 6 H PRN FOR PAIN  3    valACYclovir (VALTREX) 1000 MG tablet Take 1 tablet (1,000 mg total) by mouth 2 (two) times daily. for 11 days 22 tablet 0     No current facility-administered medications on file prior to visit.

## 2025-06-10 ENCOUNTER — CLINICAL SUPPORT (OUTPATIENT)
Dept: REHABILITATION | Facility: HOSPITAL | Age: 40
End: 2025-06-10
Attending: OTOLARYNGOLOGY
Payer: MEDICARE

## 2025-06-10 DIAGNOSIS — G51.0 FACIAL PALSY: Primary | ICD-10-CM

## 2025-06-10 PROCEDURE — 97165 OT EVAL LOW COMPLEX 30 MIN: CPT | Mod: PO

## 2025-06-10 PROCEDURE — 97530 THERAPEUTIC ACTIVITIES: CPT | Mod: PO

## 2025-06-10 NOTE — PROGRESS NOTES
"[facility] Occupational Therapy  Initial Facial (CN VII) Palsy Evaluation     Date: 6/10/2025  Patient: Rhina Hdez  Chart Number: [id number]    Therapy Diagnosis:   Encounter Diagnosis   Name Primary?    Facial palsy Yes     Physician: Sandi Peña MD    Physician Orders: Eval and Treat - Facial paralysis   Medical Diagnosis: G51.0 (ICD-10-CM) - Facial nerve paralysis   Evaluation Date: 6/10/2025  Plan of Care Expiration Period: 10/31/2025  Insurance Authorization period Expiration: 5/20/2026  Date of Return to MD: 7/24/25 ENT  Visit # / Visits Authorized: 1 / 1  FOTO: 1/2    Time In: 0851   Time Out: 0956  Total Billable (one on one) Time: 65 minutes    Precautions: Standard    Subjective     History of Current Condition: Rhina Hdez is a 39 y.o. female who presents to [facility] for evaluation and treatment secondary to facial nerve paralysis. Per chart review, pt went to the hospital on 5/14/2025 for right ear pain with purulent drainage of 1 week duration. She went to her PCP prior to going to the hospital (~ 1 wk prior). Her ears were irrigated and she was prescribed oral antibiotics. However, despite medication, her symptoms progressed, which prompted her to go to the ED. She was dx with bilateral R>L external/media/mastoiditis with accompanying R facial droop. She was then seen by OP ENT on 5/20/2025. She is taking steroid as prescribed. She was also advise to tape her eye or get eye patch with education provided on how to appropriately tape eye. She reported that she feels like her facial function has improved "a little bit". She reported her right face is swollen. "I can't eat, the food comes back out of my mouth." She also reported that it is difficult to smile and that some words are hard to speak, specially "B" sounds. She reported she is now done taking the steroids. She reported she has been compliant with taping her eye. " "She has been wearing sunglasses. She is having excessive tearing. She reported that she has experienced facial droop in the past. She cannot recall which side it was but reported that it resolved in about a week.     Involved Side: Right   Dominant Side: Right  Date of Onset: Around 2024; she reported right facial palsy did not start until after she started taking antibiotics   Surgical Procedure: N/A  Imaging: see full imaging in imaging section; CT scan films   CT Temporal Bone and Head 2025: "IMPRESSION - 1. No acute intracranial abnormalities identified.  2. Complete opacification of the bilateral mastoid air cells and middle ear cavities.  Such findings can be seen with potential otitis media and mastoiditis in the right clinical setting.  3. Complete occlusion of the right and partial incomplete occlusion of the left external auditory canals.  Such findings can be seen with potential otitis externa.  Clinical correlation is obviously essential."  Previous Therapy: None     Past Medical History/Physical Systems Review:     Past Medical History:  Rhina Hdez  has a past medical history of Anemia, Asthma, Bacteremia due to methicillin resistant Staphylococcus aureus, Cutaneous lupus erythematosus, Lupus, Psoriasis-like skin disease, SLE (systemic lupus erythematosus), and Spongiotic psoriasiform dermatitis.    Past Surgical History:  Rhina Hdez  has a past surgical history that includes  section, classic.    Current Medications:  Rhina has a current medication list which includes the following prescription(s): artificial tears, doxycycline, ferrous sulfate, hydroxychloroquine, ketoconazole, sertraline, tramadol, and valacyclovir.    Allergies:  Review of patient's allergies indicates:   Allergen Reactions    Amoxicillin-pot clavulanate Rash    Sulfa (sulfonamide antibiotics) Rash        Patient's Goals for Therapy: "to get my face back to normal"     Pain:  Pain Related Behaviors " Observed: No   Functional Pain Scale Rating 0-10:   n/a/10 on average  n/a/10 at best  n/a/10 at worst  Location: N/A  Description: N/A  Aggravating Factors: N/A  Easing Factors: N/A    Occupation:    Working presently: unemployed    Functional Limitations/Social History:    Prior Level of Function: Independent with all ADLs, IADLs, and functional mobility   Current Level of Function: Independent with all ADLs, IADLs, and functional mobility     Home/Living environment : lives with their family (3 children)   Home Access: 1 story home; [address]   DME: none     Driving: Yes     Facial Clinimetric Evaluation (FaCE) Instrument: see full copy in media section (higher score indicates better function; 0=worst; 100=best)  (Derived from the following formula: [(sum of all 15 items - 15) / 60) x 100] OR [(total score - # of items scored) / (# of items scored x 4) x 100]  Total Score: 16.7     Objective     [facility] Facial Grading System; see full copy in media section (higher percentage indicates better movement; 100%=full range movement)     Resting Symmetry Standard Expressions Symmetry of Voluntary Movement Synkinesis   Eye: 0 Forehead Wrinkle: 1 0   Cheek: 1 Gentle Eye Closure: 3 0   Mouth: 1 Open Mouth Smile: 1 0   -- Snarl: 1 0   -- Lip Pucker: 3 0   Voluntary Movement Score: 36 - Resting Symmetry Score: 10 - Synkinesis Score: 0 = Composite Score: 26%      The following images can be seen in media section:   - At rest  - Forehead wrinkle (frontalis (FRO))  - Gentle eye closure (orbicularis oculi superioris (OCS))   - Tight eye closure (orbicularis oculi superioris (OCS))   - Open mouth smile (zygomaticus (ZYG)/risorius (RIS))   - Closed mouth smile (zygomaticus (ZYG))  - Snarl (levator labii alaeque (LLA)/levator labii superioris (LLS))  - Lip pucker (orbicularis oris superioris (OOS)/orbicularis oris inferioris (OOI))     Sensation/Palpation:   - Forehead (V1): intact  - Cheeks (V2): intact  - Chin/Jaw (V3): intact  -  "Zygomaticus major & minor muscle: impaired; tightness and "swelling" on right side   - Buccinator muscle: impaired; "tension" on right side     TMJ screen: negative    Physical Exam  Postural examination: WNL  Head Control/Neck Mobility: WNL                    Functional Status      Functional Mobility: Independent     ADL's:  Feeding: She is trying to chew on the right side; regardless of the side she is chewing on food spills from mouth   Drinking: Only drinking from a straw placed left of midline; unable to drink from a bottle or cup     IADL's: Independent     Comments:     Intake Outcome Measure for FOTO Orofacial Survey    Therapist reviewed FOTO scores for Rhina Hdez on 6/10/2025.   FOTO documents entered into [employer] - see Media section.    Intake Score: 26%     Treatment     Treatment Time In: 0914  Treatment Time Out: 0956  Total Treatment time separate from Evaluation time: 42    Rhina participated in dynamic functional therapeutic activities to improve functional performance for 42 minutes, including:  - Facial home exercise program reviewed and administered including the following exercises/stretches:   Eyelid stretch - completed 3 sets x 30 sec following by 5 active eye closures   Use of margaret roller  Cheek massage for zygomaticus muscles - completed   Cheek stretch for buccinator muscle - completed   Nose massage - completed   Forehead stretch - completed   Smile practice with assisted smile using bimanual contacts  Completed AROM and AAROM closed mouth smile with mirror feedback, x2-3 reps   Pt educated on "tug of war" concept and decreasing excursion on affected side to try to achieve increased excursion on affected side  Pt educated on importance of thinking happy thoughts   Lip pucker   Completed with mirror feedback; pt educated to focus on midline symmetry   Forehead elevation   Completed with mirror feedback    - Reviewed neuro muscular re-education key points including the following: "   Importance of NOT performing mass/maximal movements  Importance of NOT trying to form facial movements using compensatory/uninvolved portions of face  Definition of synkinesis   Reviewed stages of facial palsy  Order of HEP includin) Relaxation 2) Stretches 3) Exercises  Reasoning for synkinesis including that it is unknown why some individuals with facial palsy develop synkinesis and others do not   Goal of neuromuscular re-education exercises  Use of visual and tactile feedback   Facial nerve regeneration   Practice schedule     Home Exercises and Patient Education Provided    Education provided:   -role of OT, goals for OT, scheduling/cancellations, insurance limitations with patient.  -Additional Education provided: see above     Written Home Exercises Provided: yes.  Exercises were reviewed and Rhina was able to demonstrate them prior to the end of the session.    Rhina demonstrated good  understanding of the education provided.     See EMR under Patient Instructions for exercises provided 6/10/2025.    Assessment     Rhina Hdez is a 39 y.o. female referred to outpatient occupational therapy and presents with a medical diagnosis of right sided facial nerve paralysis resulting in impaired facial range of motion, limiting facial movement, facial comfort, oralmotor function, eye movement/comfort, lacrimal control, and social function and demonstrates limitations as described in the chart below. Pt is in acute stage of Bell's palsy, almost 1 month s/p onset. Pt presents with flaccid right hemiface with incomplete eye closure. Pt with absent smile activation or forehead elevation. At rest, pt with less pronounced nasolabial fold and corner of the mouth is drooped. Pt with excessive tearing and reported difficulty with keeping food/drink in mouth when feeding and drinking. Sensation intact but tightness reported in right cheek. Established initial HEP focusing on facial stretches and exercises along with  thorough pt education; pt understanding and receptive to all education topics. Following medical record review it is determined that patient will benefit from occupational therapy services in order to maximize facial and oralmotor function needed for feeding/drink and social function/participation.     Patient prognosis is Good due to acute stage  Patient will benefit from skilled outpatient Occupational Therapy to address the deficits stated above and in the chart below, provide patient/family education, and to maximize patient's level of independence.     Plan of care discussed with patient: Yes  Patient's spiritual, cultural and educational needs considered and patient is agreeable to the plan of care and goals as stated below:     Anticipated Barriers for therapy: none     Medical Necessity is demonstrated by the following  Occupational Profile/History  Co-morbidities and personal factors that may impact the plan of care [x] LOW: Brief chart review  [] MODERATE: Expanded chart review   [] HIGH: Extensive chart review    Moderate / High Support Documentation: N/A     Examination  Performance deficits relating to physical, cognitive or psychosocial skills that result in activity limitations and/or participation restrictions  [x] LOW: addressing 1-3 Performance deficits  [] MODERATE: 3-5 Performance deficits  [] HIGH: 5+ Performance deficits (please support below)    Moderate / High Support Documentation: N/A    Physical:  Decreased facial movement; facial palsy on R    Cognitive:  No Deficits    Psychosocial:    No Deficits     Treatment Options [x] LOW: Limited options  [] MODERATE: Several options  [] HIGH: Multiple options      Decision Making/ Complexity Score: low       The following goals were discussed with the patient and patient is in agreement with them as to be addressed in the treatment plan.     Goals:  Short Term Goals: 4 visits   1) Pt will be independent with neuromuscular re-education exercises  and facial massages.   2) Pt to score 4 or more (demonstrating ~1 mm scleral show) with gentle eye closure.   3) Score on FaCE Instrument to improve to >/= 30 points to demonstrate improved self perception of facial, oral, and ocular function.   4) Score on [facility] Facial Grading System to increase by at least 15% from baseline (eval) to demonstrate improved facial symmetry and or decreased synkinesis.     Long Term Goals: 8 visits  1) Pt to demonstrate full eye closure on command   2) Score on FaCE Instrument to improve to >/= 50 points to demonstrate improved self perception of facial, oral, and ocular function.   3) Score on [facility] Facial Grading System to increase by at least 25% from baseline (eval) to demonstrate improved facial symmetry and or decreased synkinesis.   4) Pt will demonstrate/report ability to better hold food in mouth for improved oralmotor performance during feeding.  5) Pt will demonstrate/report ability to drink from cup and/or bottle without spilling and drink from straw at midline  6) Pt will demonstrate improved midline symmetry with lip pucker   7) Pt will demonstrate symmetrical smile with at least 90% satisfaction rating (prefers closed mouth smile)     Plan     Certification Period/Plan of care expiration: 6/10/2025 to 10/31/2025.    Outpatient Occupational Therapy 1 times weekly for 8 weeks/total visits to include the following interventions: Neuromuscular Re-ed, Patient Education, Self Care, Therapeutic Activities, and Therapeutic Exercise.    *Pt currently scheduled once every 2-3 wks; will increase or decrease frequency as deemed necessary; total 8 visits     Xochilt Cardona OT      I certify the need for these services furnished under this plan of treatment and while under my care.  ____________________________________ Physician/Referring Practitioner   Date of Signature

## 2025-06-10 NOTE — PATIENT INSTRUCTIONS
"Eyelid Stretch:   - Hold eyelashes and pull down while pulling eyebrow up (note: can use an eyelash curler to help pull eyelashes down)   - Don't press the lid against the eyeball, pull it out a litlte  - Hold stretch for 30-60 seconds and then actively close the eye. Perform about 3 times.   - After the stretch, gently close eye at least 10 times and attempt to hold for 3-5 seconds     Use of Margaret Roller or Kushala Stone (margaret or vikki quartz):   - Start at midline and pull outwards and down neck   - Use larger part for cheeks/forehead and smaller part for under eyes  - used to bring blood flow to the area. If the face isn't moving normally, normal pumping isn't happening causing some sort of edema  - Perform 2-3 times a day  - Can find tutorial on [facility] Facebook page or find video via [employer] or youtube.com  - [employer]com search "margaret roller"    Remember:   - Use mirror feedback when performing exercises  - Mindfulness and relaxation is important. Feel how the unaffected side of your face is moving as compared to the affected side of your face  - Limit synkinesis - you DO NOT have this, but avoid compensating with other parts of your face to perform difficult movements   - Do not produce mass/maximal movement                                                                        "

## 2025-07-02 ENCOUNTER — TELEPHONE (OUTPATIENT)
Dept: REHABILITATION | Facility: HOSPITAL | Age: 40
End: 2025-07-02
Payer: MEDICARE

## 2025-07-02 NOTE — TELEPHONE ENCOUNTER
Pt marked herself as on site but never entered [facility]. Called patient to discuss whereabouts, but she did not answer. Left voicemail asking pt to call back to reschedule with clinic number provided.     Xochilt Cardona, LILLIANA, STEFAN  07/02/2025

## 2025-07-22 ENCOUNTER — DOCUMENTATION ONLY (OUTPATIENT)
Dept: REHABILITATION | Facility: HOSPITAL | Age: 40
End: 2025-07-22
Payer: MEDICARE

## 2025-07-22 NOTE — PROGRESS NOTES
[facility]  Discharge Note    Name: Rhina Hdez  Clinic Number: [id number]    Therapy Diagnosis:        Encounter Diagnosis   Name Primary?    Facial palsy Yes     Physician: Sandi Peña MD     Physician Orders: Eval and Treat - Facial paralysis   Medical Diagnosis: G51.0 (ICD-10-CM) - Facial nerve paralysis   Evaluation Date: 6/10/2025    Date of Last visit: 6/10/2025  Total Visits Received: Eval only     ASSESSMENT      Pt did not attend 2 scheduled follow up sessions post eval. Spoke with pt today. She reported that she was attempting to cancel visit but accidentally marked herself as on site. She reported that her facial movement/function is better, that she is feeling back to normal, and wishes to discharge from OT.     Discharge reason: Other:  Pt wishes to d/c from OT due to reported symptom resolution    Discharge FOTO Score: Intake only    Goals: No goals formally met (see eval for goals); no follow up sessions    PLAN     This patient is discharged from Occupational Therapy      Xochilt Cardona, OT